# Patient Record
Sex: MALE | Race: WHITE | NOT HISPANIC OR LATINO | Employment: OTHER | URBAN - NONMETROPOLITAN AREA
[De-identification: names, ages, dates, MRNs, and addresses within clinical notes are randomized per-mention and may not be internally consistent; named-entity substitution may affect disease eponyms.]

---

## 2017-02-03 ENCOUNTER — HOSPITAL ENCOUNTER (OUTPATIENT)
Facility: MEDICAL CENTER | Age: 82
End: 2017-02-03
Attending: FAMILY MEDICINE
Payer: MEDICARE

## 2017-02-03 LAB
INR PPP: 3.85 (ref 0.87–1.13)
PROTHROMBIN TIME: 39 SEC (ref 12–14.6)

## 2017-02-03 PROCEDURE — 85610 PROTHROMBIN TIME: CPT

## 2017-02-21 ENCOUNTER — HOSPITAL ENCOUNTER (OUTPATIENT)
Facility: MEDICAL CENTER | Age: 82
End: 2017-02-21
Attending: FAMILY MEDICINE
Payer: MEDICARE

## 2017-02-21 LAB
HCT VFR BLD AUTO: 34.2 % (ref 42–52)
HGB BLD-MCNC: 11.2 G/DL (ref 14–18)
INR PPP: 2.8 (ref 0.87–1.13)
PROTHROMBIN TIME: 30.4 SEC (ref 12–14.6)

## 2017-02-21 PROCEDURE — 85610 PROTHROMBIN TIME: CPT

## 2017-02-21 PROCEDURE — 85018 HEMOGLOBIN: CPT

## 2017-02-21 PROCEDURE — 85014 HEMATOCRIT: CPT

## 2017-03-07 ENCOUNTER — HOSPITAL ENCOUNTER (OUTPATIENT)
Facility: MEDICAL CENTER | Age: 82
End: 2017-03-07
Attending: FAMILY MEDICINE
Payer: MEDICARE

## 2017-03-07 LAB
INR PPP: 2.05 (ref 0.87–1.13)
PROTHROMBIN TIME: 23.8 SEC (ref 12–14.6)

## 2017-03-07 PROCEDURE — 85610 PROTHROMBIN TIME: CPT

## 2017-03-20 ENCOUNTER — HOSPITAL ENCOUNTER (OUTPATIENT)
Facility: MEDICAL CENTER | Age: 82
DRG: 394 | End: 2017-03-20
Attending: FAMILY MEDICINE
Payer: MEDICARE

## 2017-03-20 LAB
HCT VFR BLD AUTO: 35.1 % (ref 42–52)
HGB BLD-MCNC: 11.6 G/DL (ref 14–18)
INR PPP: 2.85 (ref 0.87–1.13)
PROTHROMBIN TIME: 30.8 SEC (ref 12–14.6)

## 2017-03-22 ENCOUNTER — APPOINTMENT (OUTPATIENT)
Dept: RADIOLOGY | Facility: MEDICAL CENTER | Age: 82
DRG: 394 | End: 2017-03-22
Attending: EMERGENCY MEDICINE
Payer: MEDICARE

## 2017-03-22 ENCOUNTER — HOSPITAL ENCOUNTER (INPATIENT)
Facility: MEDICAL CENTER | Age: 82
LOS: 5 days | DRG: 394 | End: 2017-03-27
Attending: EMERGENCY MEDICINE | Admitting: INTERNAL MEDICINE
Payer: MEDICARE

## 2017-03-22 ENCOUNTER — RESOLUTE PROFESSIONAL BILLING HOSPITAL PROF FEE (OUTPATIENT)
Dept: MEDSURG UNIT | Facility: MEDICAL CENTER | Age: 82
End: 2017-03-22
Payer: MEDICARE

## 2017-03-22 DIAGNOSIS — K61.0 PERIANAL ABSCESS: ICD-10-CM

## 2017-03-22 DIAGNOSIS — H54.7 BLINDNESS: ICD-10-CM

## 2017-03-22 DIAGNOSIS — K60.3 PERIANAL FISTULA: ICD-10-CM

## 2017-03-22 LAB
ALBUMIN SERPL BCP-MCNC: 3.2 G/DL (ref 3.2–4.9)
ALBUMIN/GLOB SERPL: 1.1 G/DL
ALP SERPL-CCNC: 60 U/L (ref 30–99)
ALT SERPL-CCNC: 8 U/L (ref 2–50)
ANION GAP SERPL CALC-SCNC: 7 MMOL/L (ref 0–11.9)
APPEARANCE UR: CLEAR
AST SERPL-CCNC: 11 U/L (ref 12–45)
BASOPHILS # BLD AUTO: 0.4 % (ref 0–1.8)
BASOPHILS # BLD: 0.01 K/UL (ref 0–0.12)
BILIRUB SERPL-MCNC: 0.6 MG/DL (ref 0.1–1.5)
BILIRUB UR QL STRIP.AUTO: NEGATIVE
BUN SERPL-MCNC: 27 MG/DL (ref 8–22)
CALCIUM SERPL-MCNC: 9.9 MG/DL (ref 8.5–10.5)
CHLORIDE SERPL-SCNC: 109 MMOL/L (ref 96–112)
CO2 SERPL-SCNC: 25 MMOL/L (ref 20–33)
COLOR UR: NORMAL
CREAT SERPL-MCNC: 1.08 MG/DL (ref 0.5–1.4)
EOSINOPHIL # BLD AUTO: 0.02 K/UL (ref 0–0.51)
EOSINOPHIL NFR BLD: 0.8 % (ref 0–6.9)
ERYTHROCYTE [DISTWIDTH] IN BLOOD BY AUTOMATED COUNT: 48.8 FL (ref 35.9–50)
GFR SERPL CREATININE-BSD FRML MDRD: >60 ML/MIN/1.73 M 2
GLOBULIN SER CALC-MCNC: 3 G/DL (ref 1.9–3.5)
GLUCOSE SERPL-MCNC: 108 MG/DL (ref 65–99)
GLUCOSE UR STRIP.AUTO-MCNC: NEGATIVE MG/DL
HCT VFR BLD AUTO: 29.5 % (ref 42–52)
HGB BLD-MCNC: 9.7 G/DL (ref 14–18)
IMM GRANULOCYTES # BLD AUTO: 0.03 K/UL (ref 0–0.11)
IMM GRANULOCYTES NFR BLD AUTO: 1.2 % (ref 0–0.9)
KETONES UR STRIP.AUTO-MCNC: NEGATIVE MG/DL
LACTATE BLD-SCNC: 1.2 MMOL/L (ref 0.5–2)
LEUKOCYTE ESTERASE UR QL STRIP.AUTO: NEGATIVE
LYMPHOCYTES # BLD AUTO: 0.63 K/UL (ref 1–4.8)
LYMPHOCYTES NFR BLD: 25.1 % (ref 22–41)
MCH RBC QN AUTO: 28.9 PG (ref 27–33)
MCHC RBC AUTO-ENTMCNC: 32.9 G/DL (ref 33.7–35.3)
MCV RBC AUTO: 87.8 FL (ref 81.4–97.8)
MICRO URNS: NORMAL
MONOCYTES # BLD AUTO: 0.25 K/UL (ref 0–0.85)
MONOCYTES NFR BLD AUTO: 10 % (ref 0–13.4)
NEUTROPHILS # BLD AUTO: 1.57 K/UL (ref 1.82–7.42)
NEUTROPHILS NFR BLD: 62.5 % (ref 44–72)
NITRITE UR QL STRIP.AUTO: NEGATIVE
NRBC # BLD AUTO: 0 K/UL
NRBC BLD AUTO-RTO: 0 /100 WBC
PH UR STRIP.AUTO: 6 [PH]
PLATELET # BLD AUTO: 120 K/UL (ref 164–446)
PMV BLD AUTO: 10 FL (ref 9–12.9)
POTASSIUM SERPL-SCNC: 3.5 MMOL/L (ref 3.6–5.5)
PROT SERPL-MCNC: 6.2 G/DL (ref 6–8.2)
PROT UR QL STRIP: NEGATIVE MG/DL
RBC # BLD AUTO: 3.36 M/UL (ref 4.7–6.1)
RBC UR QL AUTO: NEGATIVE
SODIUM SERPL-SCNC: 141 MMOL/L (ref 135–145)
SP GR UR STRIP.AUTO: 1.01
WBC # BLD AUTO: 2.5 K/UL (ref 4.8–10.8)

## 2017-03-22 PROCEDURE — 700105 HCHG RX REV CODE 258: Performed by: INTERNAL MEDICINE

## 2017-03-22 PROCEDURE — 87205 SMEAR GRAM STAIN: CPT

## 2017-03-22 PROCEDURE — 93005 ELECTROCARDIOGRAM TRACING: CPT | Performed by: INTERNAL MEDICINE

## 2017-03-22 PROCEDURE — 304562 HCHG STAT O2 MASK/CANNULA

## 2017-03-22 PROCEDURE — 700105 HCHG RX REV CODE 258: Performed by: EMERGENCY MEDICINE

## 2017-03-22 PROCEDURE — 87086 URINE CULTURE/COLONY COUNT: CPT

## 2017-03-22 PROCEDURE — 87070 CULTURE OTHR SPECIMN AEROBIC: CPT

## 2017-03-22 PROCEDURE — 94760 N-INVAS EAR/PLS OXIMETRY 1: CPT

## 2017-03-22 PROCEDURE — 96366 THER/PROPH/DIAG IV INF ADDON: CPT

## 2017-03-22 PROCEDURE — 304561 HCHG STAT O2

## 2017-03-22 PROCEDURE — 71010 DX-CHEST-PORTABLE (1 VIEW): CPT

## 2017-03-22 PROCEDURE — 83605 ASSAY OF LACTIC ACID: CPT

## 2017-03-22 PROCEDURE — 74177 CT ABD & PELVIS W/CONTRAST: CPT

## 2017-03-22 PROCEDURE — 87186 SC STD MICRODIL/AGAR DIL: CPT | Mod: 91

## 2017-03-22 PROCEDURE — 700111 HCHG RX REV CODE 636 W/ 250 OVERRIDE (IP): Performed by: INTERNAL MEDICINE

## 2017-03-22 PROCEDURE — 96365 THER/PROPH/DIAG IV INF INIT: CPT

## 2017-03-22 PROCEDURE — 81003 URINALYSIS AUTO W/O SCOPE: CPT

## 2017-03-22 PROCEDURE — 87077 CULTURE AEROBIC IDENTIFY: CPT | Mod: 91

## 2017-03-22 PROCEDURE — 93010 ELECTROCARDIOGRAM REPORT: CPT | Performed by: INTERNAL MEDICINE

## 2017-03-22 PROCEDURE — 87040 BLOOD CULTURE FOR BACTERIA: CPT

## 2017-03-22 PROCEDURE — 80053 COMPREHEN METABOLIC PANEL: CPT

## 2017-03-22 PROCEDURE — 700111 HCHG RX REV CODE 636 W/ 250 OVERRIDE (IP): Performed by: EMERGENCY MEDICINE

## 2017-03-22 PROCEDURE — 96368 THER/DIAG CONCURRENT INF: CPT

## 2017-03-22 PROCEDURE — 99285 EMERGENCY DEPT VISIT HI MDM: CPT

## 2017-03-22 PROCEDURE — 700117 HCHG RX CONTRAST REV CODE 255: Performed by: EMERGENCY MEDICINE

## 2017-03-22 PROCEDURE — 770006 HCHG ROOM/CARE - MED/SURG/GYN SEMI*

## 2017-03-22 PROCEDURE — 85025 COMPLETE CBC W/AUTO DIFF WBC: CPT

## 2017-03-22 RX ORDER — POTASSIUM CHLORIDE 20 MEQ/1
10 TABLET, EXTENDED RELEASE ORAL
COMMUNITY

## 2017-03-22 RX ORDER — DOXYCYCLINE 100 MG/1
100 TABLET ORAL 2 TIMES DAILY
Status: ON HOLD | COMMUNITY
Start: 2017-03-14 | End: 2017-03-27

## 2017-03-22 RX ORDER — ALFUZOSIN HYDROCHLORIDE 10 MG/1
10 TABLET, EXTENDED RELEASE ORAL DAILY
COMMUNITY

## 2017-03-22 RX ORDER — AMOXICILLIN 250 MG
2 CAPSULE ORAL 2 TIMES DAILY
Status: DISCONTINUED | OUTPATIENT
Start: 2017-03-22 | End: 2017-03-27 | Stop reason: HOSPADM

## 2017-03-22 RX ORDER — POLYETHYLENE GLYCOL 3350 17 G/17G
1 POWDER, FOR SOLUTION ORAL
Status: DISCONTINUED | OUTPATIENT
Start: 2017-03-22 | End: 2017-03-27 | Stop reason: HOSPADM

## 2017-03-22 RX ORDER — SODIUM CHLORIDE 9 MG/ML
INJECTION, SOLUTION INTRAVENOUS CONTINUOUS
Status: DISCONTINUED | OUTPATIENT
Start: 2017-03-22 | End: 2017-03-24

## 2017-03-22 RX ORDER — AMPICILLIN AND SULBACTAM 2; 1 G/1; G/1
3 INJECTION, POWDER, FOR SOLUTION INTRAMUSCULAR; INTRAVENOUS ONCE
Status: COMPLETED | OUTPATIENT
Start: 2017-03-22 | End: 2017-03-22

## 2017-03-22 RX ORDER — FUROSEMIDE 20 MG/1
10 TABLET ORAL
COMMUNITY

## 2017-03-22 RX ORDER — DOCUSATE CALCIUM 240 MG
240 CAPSULE ORAL DAILY
COMMUNITY

## 2017-03-22 RX ORDER — ACETAMINOPHEN 500 MG
500-1000 TABLET ORAL SEE ADMIN INSTRUCTIONS
COMMUNITY

## 2017-03-22 RX ORDER — FINASTERIDE 5 MG/1
5 TABLET, FILM COATED ORAL DAILY
COMMUNITY

## 2017-03-22 RX ORDER — GABAPENTIN 100 MG/1
100 CAPSULE ORAL 2 TIMES DAILY PRN
COMMUNITY

## 2017-03-22 RX ORDER — SERTRALINE HYDROCHLORIDE 25 MG/1
37.5 TABLET, FILM COATED ORAL DAILY
COMMUNITY

## 2017-03-22 RX ORDER — BISACODYL 10 MG
10 SUPPOSITORY, RECTAL RECTAL
Status: DISCONTINUED | OUTPATIENT
Start: 2017-03-22 | End: 2017-03-27 | Stop reason: HOSPADM

## 2017-03-22 RX ORDER — AMLODIPINE BESYLATE 2.5 MG/1
2.5 TABLET ORAL DAILY
COMMUNITY

## 2017-03-22 RX ORDER — GABAPENTIN 100 MG/1
100 CAPSULE ORAL
COMMUNITY

## 2017-03-22 RX ORDER — CALCITRIOL 0.25 UG/1
0.25 CAPSULE, LIQUID FILLED ORAL DAILY
COMMUNITY

## 2017-03-22 RX ORDER — ACETAMINOPHEN 325 MG/1
650 TABLET ORAL EVERY 6 HOURS PRN
Status: DISCONTINUED | OUTPATIENT
Start: 2017-03-22 | End: 2017-03-27 | Stop reason: HOSPADM

## 2017-03-22 RX ORDER — WARFARIN SODIUM 2 MG/1
3 TABLET ORAL EVERY EVENING
COMMUNITY

## 2017-03-22 RX ORDER — SODIUM CHLORIDE 9 MG/ML
1000 INJECTION, SOLUTION INTRAVENOUS CONTINUOUS
Status: DISCONTINUED | OUTPATIENT
Start: 2017-03-22 | End: 2017-03-22

## 2017-03-22 RX ADMIN — PIPERACILLIN AND TAZOBACTAM 3.38 G: 3; .375 INJECTION, POWDER, LYOPHILIZED, FOR SOLUTION INTRAVENOUS; PARENTERAL at 22:40

## 2017-03-22 RX ADMIN — SODIUM CHLORIDE 1000 ML: 9 INJECTION, SOLUTION INTRAVENOUS at 15:15

## 2017-03-22 RX ADMIN — IOHEXOL 50 ML: 240 INJECTION, SOLUTION INTRATHECAL; INTRAVASCULAR; INTRAVENOUS; ORAL at 17:22

## 2017-03-22 RX ADMIN — VANCOMYCIN HYDROCHLORIDE 2300 MG: 100 INJECTION, POWDER, LYOPHILIZED, FOR SOLUTION INTRAVENOUS at 16:24

## 2017-03-22 RX ADMIN — IOHEXOL 100 ML: 350 INJECTION, SOLUTION INTRAVENOUS at 17:22

## 2017-03-22 RX ADMIN — AMPICILLIN SODIUM AND SULBACTAM SODIUM 3 G: 2; 1 INJECTION, POWDER, FOR SOLUTION INTRAMUSCULAR; INTRAVENOUS at 16:02

## 2017-03-22 RX ADMIN — SODIUM CHLORIDE: 9 INJECTION, SOLUTION INTRAVENOUS at 22:10

## 2017-03-22 ASSESSMENT — PAIN SCALES - GENERAL
PAINLEVEL_OUTOF10: 0

## 2017-03-22 ASSESSMENT — ENCOUNTER SYMPTOMS
SHORTNESS OF BREATH: 0
VOMITING: 0
ABDOMINAL PAIN: 1
FEVER: 0
NAUSEA: 0
ROS SKIN COMMENTS: ABSCESS

## 2017-03-22 NOTE — IP AVS SNAPSHOT
" <p align=\"LEFT\"><IMG SRC=\"//[email or web address]$/[email or web address]\" alt=\"Image\" WIDTH=\"50%\" HEIGHT=\"200\" BORDER=\"\"></p>                   Name:Jimmy Perez  Medical Record Number:[id number]  CSN: [id number]    YOB: 1925   Age: 89 y.o.  Sex: male  HT:1.829 m (6') WT: 90.1 kg (198 lb 10.2 oz)          Admit Date: 3/22/2017     Discharge Date:   Today's Date: 3/27/2017  Attending Doctor:  Alice Yo M.D.                  Allergies:  Review of patient's allergies indicates no known allergies.             Medication List      Take these Medications        Instructions    acetaminophen 500 MG Tabs   Commonly known as:  TYLENOL    Take 500-1,000 mg by mouth See Admin Instructions. 1000 mg @ 0800 500 mg @ 1200 1000 mg @@ 2000   Dose:  500-1000 mg       alfuzosin 10 MG SR tablet   Commonly known as:  UROXATRAL    Take 10 mg by mouth every day.   Dose:  10 mg       amlodipine 2.5 MG Tabs   Commonly known as:  NORVASC    Take 2.5 mg by mouth every day.   Dose:  2.5 mg       amoxicillin-clavulanate 875-125 MG Tabs   Commonly known as:  AUGMENTIN    Take 1 Tab by mouth 2 times a day for 5 days.   Dose:  1 Tab       calcitRIOL 0.25 MCG Caps   Commonly known as:  ROCALTROL    Take 0.25 mcg by mouth every day.   Dose:  0.25 mcg       CALCIUM 500+D 500-200 MG-UNIT Tabs   Generic drug:  Calcium Carb-Cholecalciferol    Take 1 Tab by mouth every morning with breakfast.   Dose:  1 Tab       docusate calcium 240 MG Caps   Commonly known as:  SURFAK    Take 240 mg by mouth every day.   Dose:  240 mg       finasteride 5 MG Tabs   Commonly known as:  PROSCAR    Take 5 mg by mouth every day.   Dose:  5 mg       furosemide 20 MG Tabs   Commonly known as:  LASIX    Take 10 mg by mouth every 48 hours.   Dose:  10 mg       * gabapentin 100 MG Caps   Commonly known as:  NEURONTIN    Take 100 mg by mouth every bedtime.   Dose:  100 mg       * gabapentin 100 MG Caps   Commonly known as:  NEURONTIN    Take 100 mg by mouth 2 times a " day as needed. Indications: Diabetes with Nerve Disease   Dose:  100 mg       potassium chloride SA 20 MEQ Tbcr   Commonly known as:  Kdur    Take 10 mEq by mouth every 48 hours.   Dose:  10 mEq       sertraline 25 MG tablet   Commonly known as:  ZOLOFT    Take 37.5 mg by mouth every day.   Dose:  37.5 mg       warfarin 2 MG Tabs   Commonly known as:  COUMADIN    Take 3 mg by mouth every evening.   Dose:  3 mg       * Notice:  This list has 2 medication(s) that are the same as other medications prescribed for you. Read the directions carefully, and ask your doctor or other care provider to review them with you.

## 2017-03-22 NOTE — ED NOTES
Lab called with critical result of WBC at 2.5. Critical lab result read back..   Dr. Levin  notified of critical lab result at 1542 .

## 2017-03-22 NOTE — IP AVS SNAPSHOT
Home Care Instructions                                                                                                                  Name:Jimmy Perez  Medical Record Number:[id number]  CSN: [id number]    YOB: 1925   Age: 89 y.o.  Sex: male  HT:1.829 m (6') WT: 90.1 kg (198 lb 10.2 oz)          Admit Date: 3/22/2017     Discharge Date:   Today's Date: 3/27/2017  Attending Doctor:  Alice Yo M.D.                  Allergies:  Review of patient's allergies indicates no known allergies.            Discharge Instructions       Discharge Instructions    Discharged to home by [facility] with self. Discharged via wheelchair, hospital escort: Yes.  Special equipment needed: Not Applicable    Be sure to schedule a follow-up appointment with your primary care doctor or any specialists as instructed.     Discharge Plan:   Influenza Vaccine Indication: Not indicated: Previously immunized this influenza season and > 8 years of age    I understand that a diet low in cholesterol, fat, and sodium is recommended for good health. Unless I have been given specific instructions below for another diet, I accept this instruction as my diet prescription.   Other diet: Nectar  thick    Special Instructions: None    · Is patient discharged on Warfarin / Coumadin?   No     · Is patient Post Blood Transfusion?  No    Depression / Suicide Risk    As you are discharged from this [facility] facility, it is important to learn how to keep safe from harming yourself.    Recognize the warning signs:  · Abrupt changes in personality, positive or negative- including increase in energy   · Giving away possessions  · Change in eating patterns- significant weight changes-  positive or negative  · Change in sleeping patterns- unable to sleep or sleeping all the time   · Unwillingness or inability to communicate  · Depression  · Unusual sadness, discouragement and loneliness  · Talk of wanting to die  · Neglect of personal  appearance   · Rebelliousness- reckless behavior  · Withdrawal from people/activities they love  · Confusion- inability to concentrate     If you or a loved one observes any of these behaviors or has concerns about self-harm, here's what you can do:  · Talk about it- your feelings and reasons for harming yourself  · Remove any means that you might use to hurt yourself (examples: pills, rope, extension cords, firearm)  · Get professional help from the community (Mental Health, Substance Abuse, psychological counseling)  · Do not be alone:Call your Safe Contact- someone whom you trust who will be there for you.  · Call your local CRISIS HOTLINE 593-8320 or 296-569-1970  · Call your local Children's Mobile Crisis Response Team [place] (274) 471-6164 or www.[employer]  · Call the toll free National Suicide Prevention Hotlines   · National Suicide Prevention Lifeline 365-680-RFDS (6402)  · [employer] Line Network 800-SUICIDE (437-0096)           Discharge Medication Instructions:    Below are the medications your physician expects you to take upon discharge:    Review all your home medications and newly ordered medications with your doctor and/or pharmacist. Follow medication instructions as directed by your doctor and/or pharmacist.    Please keep your medication list with you and share with your physician.               Medication List      START taking these medications        Instructions    amoxicillin-clavulanate 875-125 MG Tabs   Commonly known as:  AUGMENTIN    Take 1 Tab by mouth 2 times a day for 5 days.   Dose:  1 Tab         CONTINUE taking these medications        Instructions    acetaminophen 500 MG Tabs   Last time this was given:  650 mg on 3/26/2017  9:09 PM   Commonly known as:  TYLENOL    Take 500-1,000 mg by mouth See Admin Instructions. 1000 mg @ 0800 500 mg @ 1200 1000 mg @@ 2000   Dose:  500-1000 mg       alfuzosin 10 MG SR tablet   Commonly known as:  UROXATRAL    Take 10 mg by  mouth every day.   Dose:  10 mg       amlodipine 2.5 MG Tabs   Last time this was given:  2.5 mg on 3/27/2017  9:07 AM   Commonly known as:  NORVASC    Take 2.5 mg by mouth every day.   Dose:  2.5 mg       calcitRIOL 0.25 MCG Caps   Last time this was given:  0.25 mcg on 3/27/2017  9:07 AM   Commonly known as:  ROCALTROL    Take 0.25 mcg by mouth every day.   Dose:  0.25 mcg       CALCIUM 500+D 500-200 MG-UNIT Tabs   Generic drug:  Calcium Carb-Cholecalciferol    Take 1 Tab by mouth every morning with breakfast.   Dose:  1 Tab       docusate calcium 240 MG Caps   Commonly known as:  SURFAK    Take 240 mg by mouth every day.   Dose:  240 mg       finasteride 5 MG Tabs   Last time this was given:  5 mg on 3/27/2017  9:07 AM   Commonly known as:  PROSCAR    Take 5 mg by mouth every day.   Dose:  5 mg       furosemide 20 MG Tabs   Commonly known as:  LASIX    Take 10 mg by mouth every 48 hours.   Dose:  10 mg       * gabapentin 100 MG Caps   Commonly known as:  NEURONTIN    Take 100 mg by mouth every bedtime.   Dose:  100 mg       * gabapentin 100 MG Caps   Commonly known as:  NEURONTIN    Take 100 mg by mouth 2 times a day as needed. Indications: Diabetes with Nerve Disease   Dose:  100 mg       potassium chloride SA 20 MEQ Tbcr   Last time this was given:  20 mEq on 3/23/2017 12:25 PM   Commonly known as:  Kdur    Take 10 mEq by mouth every 48 hours.   Dose:  10 mEq       sertraline 25 MG tablet   Last time this was given:  37.5 mg on 3/27/2017  9:06 AM   Commonly known as:  ZOLOFT    Take 37.5 mg by mouth every day.   Dose:  37.5 mg       warfarin 2 MG Tabs   Commonly known as:  COUMADIN    Take 3 mg by mouth every evening.   Dose:  3 mg       * Notice:  This list has 2 medication(s) that are the same as other medications prescribed for you. Read the directions carefully, and ask your doctor or other care provider to review them with you.      STOP taking these medications     doxycycline monohydrate 100 MG tablet      Commonly known as:  ADOXA               Instructions           Diet / Nutrition:    Follow any diet instructions given to you by your doctor or the dietician, including how much salt (sodium) you are allowed each day.    If you are overweight, talk to your doctor about a weight reduction plan.    Activity:    Remain physically active following your doctor's instructions about exercise and activity.    Rest often.     Any time you become even a little tired or short of breath, SIT DOWN and rest.    Worsening Symptoms:    Report any of the following signs and symptoms to the doctor's office immediately:    *Pain of jaw, arm, or neck  *Chest pain not relieved by medication                               *Dizziness or loss of consciousness  *Difficulty breathing even when at rest   *More tired than usual                                       *Bleeding drainage or swelling of surgical site  *Swelling of feet, ankles, legs or stomach                 *Fever (>100ºF)  *Pink or blood tinged sputum  *Weight gain (3lbs/day or 5lbs /week)           *Shock from internal defibrillator (if applicable)  *Palpitations or irregular heartbeats                *Cool and/or numb extremities    Stroke Awareness    Common Risk Factors for Stroke include:    Age  Atrial Fibrillation  Carotid Artery Stenosis  Diabetes Mellitus  Excessive alcohol consumption  High blood pressure  Overweight   Physical inactivity  Smoking    Warning signs and symptoms of a stroke include:    *Sudden numbness or weakness of the face, arm or leg (especially on one side of the body).  *Sudden confusion, trouble speaking or understanding.  *Sudden trouble seeing in one or both eyes.  *Sudden trouble walking, dizziness, loss of balance or coordination.Sudden severe headache with no known cause.    It is very important to get treatment quickly when a stroke occurs. If you experience any of the above warning signs, call 911 immediately.                   Disclaimer          Quit Smoking / Tobacco Use:    I understand the use of any tobacco products increases my chance of suffering from future heart disease or stroke and could cause other illnesses which may shorten my life. Quitting the use of tobacco products is the single most important thing I can do to improve my health. For further information on smoking / tobacco cessation call a Toll Free Quit Line at 1-951.582.1789 (*National Cancer [place]) or 1-420.727.5004 (American Lung Association) or you can access the web based program at www.lungusa.org.    [place] Tobacco Users Help Line:  (464) 718-9854       Toll Free: 1-815.397.1858  Quit Tobacco Program [facility] (425)517-1428    Crisis Hotline:    [place] Crisis Hotline:  8-270-ZMBYMLB or 1-853.795.6171    [place] Crisis Hotline:    1-558.890.3121 or 844-834-4186    Discharge Survey:   Thank you for choosing [facility]. We hope we did everything we could to make your hospital stay a pleasant one. You may be receiving a phone survey and we would appreciate your time and participation in answering the questions. Your input is very valuable to us in our efforts to improve our service to our patients and their families.        My signature on this form indicates that:    1. I have reviewed and understand the above information.  2. My questions regarding this information have been answered to my satisfaction.  3. I have formulated a plan with my discharge nurse to obtain my prescribed medications for home.                  Disclaimer         __________________________________                     __________       ________                       Patient Signature                                                 Date                    Time

## 2017-03-22 NOTE — IP AVS SNAPSHOT
3/27/2017          Jimmy Perez  [address] [place] [address]    Dear Jimmy:    [facility] wants to ensure your discharge home is safe and you or your loved ones have had all your questions answered regarding your care after you leave the hospital.    You may receive a telephone call within two days of your discharge.  This call is to make certain you understand your discharge instructions as well as ensure we provided you with the best care possible during your stay with us.     The call will only last approximately 3-5 minutes and will be done by a nurse.    Once again, we want to ensure your discharge home is safe and that you have a clear understanding of any next steps in your care.  If you have any questions or concerns, please do not hesitate to contact us, we are here for you.  Thank you for choosing [facility] for your healthcare needs.    Sincerely,    De Carr    [facility]

## 2017-03-22 NOTE — ED PROVIDER NOTES
ED Provider Note    Scribed for Rojas Cruz M.D. by Devora Stephens. 3/22/2017, 2:54 PM.    [facility] provider[facility]  Means of arrival: Ambulance  History obtained from: Patient   History limited by: None     CHIEF COMPLAINT  Chief Complaint   Patient presents with   • Abscess     abscess on leg that has now enlarged and is on rectum. denies CP, SOB, Bleeding, any pain     HPI  Jimmy Perez is a 89 y.o. male who presents to the Emergency Department for an abscess to his buttocks that was opened in the past that has now enlarged. He states that they were packed three weeks ago but are now open and extremely painful, exacerbated with sitting.  The patient denies any recent fevers, shortness of breath, chest pain. Per patient he lives at home with his wife and has home nursing help. Patient is a very poor historian. Patient is describing that he has a hole in his backside. He is alert to person, place, time, it sounds like to events, but again a very poor historian. Sounds like the patient as above was treated for a perianal abscess and was seen by home health today and thought that the abscess was worsening, so the patient was sent to the ED for evaluation.    Further HPI limited secondary to patient's baseline mental status     REVIEW OF SYSTEMS  Review of Systems   Unable to perform ROS: mental acuity   Constitutional: Negative for fever.   Respiratory: Negative for shortness of breath.    Cardiovascular: Negative for chest pain.   Gastrointestinal: Positive for abdominal pain. Negative for nausea and vomiting.   Skin:        Abscess      PAST MEDICAL HISTORY   Past medical history of abscesses     SURGICAL HISTORY  patient denies any surgical history    SOCIAL HISTORY  Patient from [place]     FAMILY HISTORY  No family history noted    CURRENT MEDICATIONS  No current facility-administered medications on file prior to encounter.     No current outpatient prescriptions on  file prior to encounter.     ALLERGIES  No Known Allergies    PHYSICAL EXAM  VITAL SIGNS: /87 mmHg  Pulse 70  Resp 24  Ht 1.829 m (6')  Wt 90.719 kg (200 lb)  BMI 27.12 kg/m2  SpO2 95%    Constitutional: elderly in appearance, No acute distress, confused, Non-toxic appearance.   HENT: Normocephalic, Atraumatic, Bilateral external ears normal, oropharynx dry, No oral exudates, Nose normal.   Eyes: Pupils are equal round and react to light, extraocular motions are intact, conjunctiva is normal, there are no signs of exudate.   Neck: Supple, no meningeal signs.  Lymphatic: No lymphadenopathy noted.   Cardiovascular: Regular rate and rhythm without murmurs gallops or rubs.   Thorax & Lungs: No respiratory distress. Breathing comfortably. Lungs are clear to auscultation bilaterally, there are no wheezes no rales. Chest wall is nontender.  Abdomen: Soft, nontender, nondistended. Bowel sounds are present.   : Perirectal; Old scar and 2 inch gauze appears to be abscess cavity no drainage, no signs of new abscess, Right thigh, draining abscess with packing present   Skin: Warm, Dry, No erythema,   Back: No tenderness, No CVA tenderness.  Musculoskeletal: Good range of motion in all major joints. No tenderness to palpation or major deformities noted. Intact distal pulses, no clubbing, no cyanosis, no edema, AKA, left lower extremity.   Neurologic: Alert & oriented to person, place and time but poor historian, confused, Moving all extremities. No gross abnormalities.    Psychiatric: Affect normal, Judgment normal, Mood normal.     LABS  Results for orders placed or performed during the hospital encounter of 03/22/17   LACTIC ACID   Result Value Ref Range    Lactic Acid 1.2 0.5 - 2.0 mmol/L   CBC WITH DIFFERENTIAL   Result Value Ref Range    WBC 2.5 (LL) 4.8 - 10.8 K/uL    RBC 3.36 (L) 4.70 - 6.10 M/uL    Hemoglobin 9.7 (L) 14.0 - 18.0 g/dL    Hematocrit 29.5 (L) 42.0 - 52.0 %    MCV 87.8 81.4 - 97.8 fL    MCH  28.9 27.0 - 33.0 pg    MCHC 32.9 (L) 33.7 - 35.3 g/dL    RDW 48.8 35.9 - 50.0 fL    Platelet Count 120 (L) 164 - 446 K/uL    MPV 10.0 9.0 - 12.9 fL    Neutrophils-Polys 62.50 44.00 - 72.00 %    Lymphocytes 25.10 22.00 - 41.00 %    Monocytes 10.00 0.00 - 13.40 %    Eosinophils 0.80 0.00 - 6.90 %    Basophils 0.40 0.00 - 1.80 %    Immature Granulocytes 1.20 (H) 0.00 - 0.90 %    Nucleated RBC 0.00 /100 WBC    Neutrophils (Absolute) 1.57 (L) 1.82 - 7.42 K/uL    Lymphs (Absolute) 0.63 (L) 1.00 - 4.80 K/uL    Monos (Absolute) 0.25 0.00 - 0.85 K/uL    Eos (Absolute) 0.02 0.00 - 0.51 K/uL    Baso (Absolute) 0.01 0.00 - 0.12 K/uL    Immature Granulocytes (abs) 0.03 0.00 - 0.11 K/uL    NRBC (Absolute) 0.00 K/uL   COMP METABOLIC PANEL   Result Value Ref Range    Sodium 141 135 - 145 mmol/L    Potassium 3.5 (L) 3.6 - 5.5 mmol/L    Chloride 109 96 - 112 mmol/L    Co2 25 20 - 33 mmol/L    Anion Gap 7.0 0.0 - 11.9    Glucose 108 (H) 65 - 99 mg/dL    Bun 27 (H) 8 - 22 mg/dL    Creatinine 1.08 0.50 - 1.40 mg/dL    Calcium 9.9 8.5 - 10.5 mg/dL    AST(SGOT) 11 (L) 12 - 45 U/L    ALT(SGPT) 8 2 - 50 U/L    Alkaline Phosphatase 60 30 - 99 U/L    Total Bilirubin 0.6 0.1 - 1.5 mg/dL    Albumin 3.2 3.2 - 4.9 g/dL    Total Protein 6.2 6.0 - 8.2 g/dL    Globulin 3.0 1.9 - 3.5 g/dL    A-G Ratio 1.1 g/dL   URINALYSIS   Result Value Ref Range    Color Lt. Yellow     Character Clear     Specific Gravity 1.010 <1.035    Ph 6.0 5.0-8.0    Glucose Negative Negative mg/dL    Ketones Negative Negative mg/dL    Protein Negative Negative mg/dL    Bilirubin Negative Negative    Nitrite Negative Negative    Leukocyte Esterase Negative Negative    Occult Blood Negative Negative    Micro Urine Req see below    ESTIMATED GFR   Result Value Ref Range    GFR If African American >60 >60 mL/min/1.73 m 2    GFR If Non African American >60 >60 mL/min/1.73 m 2   EKG   Result Value Ref Range    Report       [facility] Emergency Dept.    Test  Date:  2017  Pt Name:    LUDY HERBERT           Department: ER  MRN:        [id number]                      Room:        09  Gender:     M                            Technician: [id number]  :        [id number]                   Requested By:ABDIEL STARR  Order #:    [id number]                    Reading MD:    Measurements  Intervals                                Axis  Rate:       70                           P:          0  NH:         216                          QRS:        -77  QRSD:       162                          T:          103  QT:         460  QTc:        497    Interpretive Statements  VENTRICULAR-PACED COMPLEXES  NO FURTHER RHYTHM ANALYSIS ATTEMPTED DUE TO PACED RHYTHM  BORDERLINE AV CONDUCTION DELAY  CONSIDER LEFT ATRIAL ABNORMALITY  LEFT BUNDLE BRANCH BLOCK  BASELINE WANDER IN LEAD(S) III,aVL,V4  No previous ECG available for comparison     All labs reviewed by me.    RADIOLOGY  CT-ABDOMEN-PELVIS WITH   Final Result      1.  Perianal fistula. Details of sphincter involvement cannot be determined on CT. No abscess is appreciated.      2.  Hiatal hernia.      3.  Diverticulosis.      4.  2.5 cm indeterminate hypodense left renal mass, possibly a hemorrhagic cyst. If clinically indicated, nonemergent renal protocol CT or MRI could be performed for further evaluation.      5.  Atherosclerosis.      6.  Small bilateral pleural effusions with adjacent atelectasis. Peribronchial thickening in the left lung base is consistent with inflammation/bronchitis.      7.  Cardiomegaly      DX-CHEST-PORTABLE (1 VIEW)   Final Result      1.  Mild basilar atelectasis or scar      2.  Enlarged cardiac silhouette      The radiologist's interpretation of all radiological studies have been reviewed by me.    EKG  Interpreted by me    Rhythm: ventricular paced complex   Rate: 70 which is normal  No further clinical details can be obtained.     COURSE & MEDICAL DECISION MAKING  Pertinent Labs & Imaging studies  reviewed. (See chart for details)    2:54 PM - Patient seen and examined at bedside. Patient will be treated with 1L NS infusion IV. Ordered abdomen-pelvis CT, chest x-ray, Lactic acid, Wound culture, CBC with diff, CMP, Urinalysis, Urine culture, blood cultures to evaluate his symptoms. Explained that the patient would need to be admitted for wound care and would be updated on testing results when they return. He understands and agrees.     3:49 PM- I was called by lab and informed of a critical WBC of 2.5. Consulting with pharmacy for course of treatment.     3:57 PM- I am treating the patient with 3 g Unasyn IV, 2300 mg Vancomycin IV.     5:55 PM- Reviewed CT scan.     5:55 PM Paged general surgeon and [facility] internal medicine.     6:06 PM- I discussed the patient's case and the above findings with [facility] internal medicine who agrees to admit the patient.     6:06 PM- Patent updated on plan for admittance. He understands and agrees.     6:06 PM- Paged general surgeon.     6:09 PM- I discussed the patient's case and the above findings with Dr. christensen (general surgeon) who agrees to consult on the patient.     Decision Making:  Upon arrival, the patient is rather confused and he stills alert person, place, sounds like to time and events, but again is a very poor historian. I initially started the patient with vancomycin and Unasyn. He does have the draining wound. I see no new signs of abscess. There is no signs of induration, but I did do a CT scan with rectal contrast through the area. There is no new findings of abscess. At this point, I do feel the patient is more of a wound care candidate edema. He does have the fistula seen on CT scan. I spoke with Dr. Christensen who will consult on the patient and I spoken to [facility] medicine. I'll admit the patient for further treatment and care.    DISPOSITION:  Patient will be admitted to [facility] internal medicine in guarded condition.    FINAL IMPRESSION  1. Perianal fistula     2. Perianal abscess    3. Blindness          I, Devora Stephens (Scribe), am scribing for, and in the presence of, Rojas Cruz M.D..    Electronically signed by: Devora Stephens (Scribe), 3/22/2017    IRojas M.D. personally performed the services described in this documentation, as scribed by Devora Stephens in my presence, and it is both accurate and complete.    The note accurately reflects work and decisions made by me.  Rojas Cruz  3/22/2017  10:27 PM    \

## 2017-03-22 NOTE — IP AVS SNAPSHOT
AlphaClone Access Code: [email or web address]  Expires: 4/26/2017  1:37 PM    Your email address is not on file at [facility].  Email Addresses are required for you to sign up for AlphaClone, please contact [phone] to verify your personal information and to provide your email address prior to attempting to register for AlphaClone.    Jimmy Perez  [address] [place] [address]    AlphaClone  A secure, online tool to manage your health information     [facility]’s AlphaClone® is a secure, online tool that connects you to your personalized health information from the privacy of your home -- day or night - making it very easy for you to manage your healthcare. Once the activation process is completed, you can even access your medical information using the AlphaClone london, which is available for free in the Apple [place] store or Google Play store.     To learn more about AlphaClone, visit [email or web address]    There are two levels of access available (as shown below):   My Chart Features  [facility] Primary Care Doctor [facility]  Specialists [facility]  Urgent  Care Non-[facility] Primary Care Doctor   Email your healthcare team securely and privately 24/7 X X X    Manage appointments: schedule your next appointment; view details of past/upcoming appointments X      Request prescription refills. X      View recent personal medical records, including lab and immunizations X X X X   View health record, including health history, allergies, medications X X X X   Read reports about your outpatient visits, procedures, consult and ER notes X X X X   See your discharge summary, which is a recap of your hospital and/or ER visit that includes your diagnosis, lab results, and care plan X X  X     How to register for [employer]MeNow[employer]:  Once your e-mail address has been verified, follow the following steps to sign up for DubMeNowt.     1. Go to  [email or web address]  2. Click on the Sign Up Now box, which takes you to the New Member Sign Up page. You  will need to provide the following information:  a. Enter your iPawn Access Code exactly as it appears at the top of this page. (You will not need to use this code after you’ve completed the sign-up process. If you do not sign up before the expiration date, you must request a new code.)   b. Enter your date of birth.   c. Enter your home email address.   d. Click Submit, and follow the next screen’s instructions.  3. Create a [employer] Networkst ID. This will be your iPawn login ID and cannot be changed, so think of one that is secure and easy to remember.  4. Create a iPawn password. You can change your password at any time.  5. Enter your Password Reset Question and Answer. This can be used at a later time if you forget your password.   6. Enter your e-mail address. This allows you to receive e-mail notifications when new information is available in iPawn.  7. Click Sign Up. You can now view your health information.    For assistance activating your iPawn account, call [phone]

## 2017-03-22 NOTE — ED NOTES
Chief Complaint   Patient presents with   • Abscess     abscess on leg that has now enlarged and is on rectum. denies CP, SOB, Bleeding, any pain

## 2017-03-23 PROBLEM — K61.0 ANAL ABSCESS: Status: ACTIVE | Noted: 2017-03-22

## 2017-03-23 PROBLEM — R78.81 BACTEREMIA DUE TO GRAM-POSITIVE BACTERIA: Status: ACTIVE | Noted: 2017-03-23

## 2017-03-23 PROBLEM — R79.1 SUPRATHERAPEUTIC INR: Status: ACTIVE | Noted: 2017-03-23

## 2017-03-23 PROBLEM — F05 DELIRIUM SUPERIMPOSED ON DEMENTIA: Status: ACTIVE | Noted: 2017-03-23

## 2017-03-23 LAB
25(OH)D3 SERPL-MCNC: 21 NG/ML (ref 30–100)
ALBUMIN SERPL BCP-MCNC: 3.1 G/DL (ref 3.2–4.9)
ALBUMIN/GLOB SERPL: 1 G/DL
ALP SERPL-CCNC: 58 U/L (ref 30–99)
ALT SERPL-CCNC: 7 U/L (ref 2–50)
ANION GAP SERPL CALC-SCNC: 6 MMOL/L (ref 0–11.9)
AST SERPL-CCNC: 10 U/L (ref 12–45)
BASOPHILS # BLD AUTO: 0.2 % (ref 0–1.8)
BASOPHILS # BLD: 0.01 K/UL (ref 0–0.12)
BILIRUB SERPL-MCNC: 0.8 MG/DL (ref 0.1–1.5)
BUN SERPL-MCNC: 27 MG/DL (ref 8–22)
CALCIUM SERPL-MCNC: 9.6 MG/DL (ref 8.5–10.5)
CHLORIDE SERPL-SCNC: 110 MMOL/L (ref 96–112)
CO2 SERPL-SCNC: 26 MMOL/L (ref 20–33)
CREAT SERPL-MCNC: 1.22 MG/DL (ref 0.5–1.4)
EOSINOPHIL # BLD AUTO: 0.01 K/UL (ref 0–0.51)
EOSINOPHIL NFR BLD: 0.2 % (ref 0–6.9)
ERYTHROCYTE [DISTWIDTH] IN BLOOD BY AUTOMATED COUNT: 48.2 FL (ref 35.9–50)
FOLATE SERPL-MCNC: 13.7 NG/ML
GFR SERPL CREATININE-BSD FRML MDRD: 56 ML/MIN/1.73 M 2
GLOBULIN SER CALC-MCNC: 3.1 G/DL (ref 1.9–3.5)
GLUCOSE SERPL-MCNC: 142 MG/DL (ref 65–99)
GRAM STN SPEC: NORMAL
HCT VFR BLD AUTO: 33.4 % (ref 42–52)
HGB BLD-MCNC: 10.9 G/DL (ref 14–18)
IMM GRANULOCYTES # BLD AUTO: 0.09 K/UL (ref 0–0.11)
IMM GRANULOCYTES NFR BLD AUTO: 1.6 % (ref 0–0.9)
INR PPP: 4.38 (ref 0.87–1.13)
LYMPHOCYTES # BLD AUTO: 0.36 K/UL (ref 1–4.8)
LYMPHOCYTES NFR BLD: 6.5 % (ref 22–41)
MCH RBC QN AUTO: 28.1 PG (ref 27–33)
MCHC RBC AUTO-ENTMCNC: 32.6 G/DL (ref 33.7–35.3)
MCV RBC AUTO: 86.1 FL (ref 81.4–97.8)
MONOCYTES # BLD AUTO: 0.79 K/UL (ref 0–0.85)
MONOCYTES NFR BLD AUTO: 14.3 % (ref 0–13.4)
NEUTROPHILS # BLD AUTO: 4.28 K/UL (ref 1.82–7.42)
NEUTROPHILS NFR BLD: 77.2 % (ref 44–72)
NRBC # BLD AUTO: 0 K/UL
NRBC BLD AUTO-RTO: 0 /100 WBC
PLATELET # BLD AUTO: 136 K/UL (ref 164–446)
PMV BLD AUTO: 10.2 FL (ref 9–12.9)
POTASSIUM SERPL-SCNC: 3.2 MMOL/L (ref 3.6–5.5)
PREALB SERPL-MCNC: 11 MG/DL (ref 18–38)
PROT SERPL-MCNC: 6.2 G/DL (ref 6–8.2)
PROTHROMBIN TIME: 43.2 SEC (ref 12–14.6)
RBC # BLD AUTO: 3.88 M/UL (ref 4.7–6.1)
SIGNIFICANT IND 70042: NORMAL
SITE SITE: NORMAL
SODIUM SERPL-SCNC: 142 MMOL/L (ref 135–145)
SOURCE SOURCE: NORMAL
TSH SERPL DL<=0.005 MIU/L-ACNC: 1.47 UIU/ML (ref 0.3–3.7)
VANCOMYCIN TROUGH SERPL-MCNC: 16.2 UG/ML (ref 10–20)
VIT B12 SERPL-MCNC: 1312 PG/ML (ref 211–911)
WBC # BLD AUTO: 5.5 K/UL (ref 4.8–10.8)

## 2017-03-23 PROCEDURE — 700102 HCHG RX REV CODE 250 W/ 637 OVERRIDE(OP): Performed by: STUDENT IN AN ORGANIZED HEALTH CARE EDUCATION/TRAINING PROGRAM

## 2017-03-23 PROCEDURE — 85025 COMPLETE CBC W/AUTO DIFF WBC: CPT

## 2017-03-23 PROCEDURE — 36415 COLL VENOUS BLD VENIPUNCTURE: CPT

## 2017-03-23 PROCEDURE — 700102 HCHG RX REV CODE 250 W/ 637 OVERRIDE(OP): Performed by: INTERNAL MEDICINE

## 2017-03-23 PROCEDURE — 92610 EVALUATE SWALLOWING FUNCTION: CPT

## 2017-03-23 PROCEDURE — 700111 HCHG RX REV CODE 636 W/ 250 OVERRIDE (IP)

## 2017-03-23 PROCEDURE — 82607 VITAMIN B-12: CPT

## 2017-03-23 PROCEDURE — 85610 PROTHROMBIN TIME: CPT

## 2017-03-23 PROCEDURE — 770006 HCHG ROOM/CARE - MED/SURG/GYN SEMI*

## 2017-03-23 PROCEDURE — 700105 HCHG RX REV CODE 258: Performed by: INTERNAL MEDICINE

## 2017-03-23 PROCEDURE — 80053 COMPREHEN METABOLIC PANEL: CPT

## 2017-03-23 PROCEDURE — 84443 ASSAY THYROID STIM HORMONE: CPT

## 2017-03-23 PROCEDURE — 84134 ASSAY OF PREALBUMIN: CPT

## 2017-03-23 PROCEDURE — 99223 1ST HOSP IP/OBS HIGH 75: CPT | Mod: AI,GC | Performed by: INTERNAL MEDICINE

## 2017-03-23 PROCEDURE — 82306 VITAMIN D 25 HYDROXY: CPT

## 2017-03-23 PROCEDURE — 700111 HCHG RX REV CODE 636 W/ 250 OVERRIDE (IP): Performed by: INTERNAL MEDICINE

## 2017-03-23 PROCEDURE — 700105 HCHG RX REV CODE 258

## 2017-03-23 PROCEDURE — G8997 SWALLOW GOAL STATUS: HCPCS | Mod: CI

## 2017-03-23 PROCEDURE — 80202 ASSAY OF VANCOMYCIN: CPT

## 2017-03-23 PROCEDURE — A9270 NON-COVERED ITEM OR SERVICE: HCPCS | Performed by: INTERNAL MEDICINE

## 2017-03-23 PROCEDURE — G8996 SWALLOW CURRENT STATUS: HCPCS | Mod: CK

## 2017-03-23 PROCEDURE — A9270 NON-COVERED ITEM OR SERVICE: HCPCS | Performed by: STUDENT IN AN ORGANIZED HEALTH CARE EDUCATION/TRAINING PROGRAM

## 2017-03-23 PROCEDURE — 82746 ASSAY OF FOLIC ACID SERUM: CPT

## 2017-03-23 RX ORDER — POTASSIUM CHLORIDE 7.45 MG/ML
10 INJECTION INTRAVENOUS ONCE
Status: COMPLETED | OUTPATIENT
Start: 2017-03-23 | End: 2017-03-23

## 2017-03-23 RX ORDER — FINASTERIDE 5 MG/1
5 TABLET, FILM COATED ORAL DAILY
Status: DISCONTINUED | OUTPATIENT
Start: 2017-03-23 | End: 2017-03-27 | Stop reason: HOSPADM

## 2017-03-23 RX ORDER — CALCITRIOL 0.25 UG/1
0.25 CAPSULE, LIQUID FILLED ORAL DAILY
Status: DISCONTINUED | OUTPATIENT
Start: 2017-03-23 | End: 2017-03-27 | Stop reason: HOSPADM

## 2017-03-23 RX ORDER — AMLODIPINE BESYLATE 5 MG/1
2.5 TABLET ORAL DAILY
Status: DISCONTINUED | OUTPATIENT
Start: 2017-03-23 | End: 2017-03-27 | Stop reason: HOSPADM

## 2017-03-23 RX ORDER — POTASSIUM CHLORIDE 1.5 G/1.58G
20 POWDER, FOR SOLUTION ORAL 2 TIMES DAILY
Status: DISCONTINUED | OUTPATIENT
Start: 2017-03-23 | End: 2017-03-27 | Stop reason: HOSPADM

## 2017-03-23 RX ORDER — WARFARIN SODIUM 3 MG/1
3 TABLET ORAL EVERY EVENING
Status: DISCONTINUED | OUTPATIENT
Start: 2017-03-23 | End: 2017-03-23

## 2017-03-23 RX ORDER — POTASSIUM CHLORIDE 20 MEQ/1
20 TABLET, EXTENDED RELEASE ORAL 2 TIMES DAILY
Status: DISCONTINUED | OUTPATIENT
Start: 2017-03-23 | End: 2017-03-23

## 2017-03-23 RX ADMIN — CHOLECALCIFEROL TAB 10 MCG (400 UNIT) 400 UNITS: 10 TAB at 12:24

## 2017-03-23 RX ADMIN — CALCIUM CARBONATE-CHOLECALCIFEROL TAB 250 MG-125 UNIT 2 TABLET: 250-125 TAB at 12:24

## 2017-03-23 RX ADMIN — VANCOMYCIN HYDROCHLORIDE 900 MG: 100 INJECTION, POWDER, LYOPHILIZED, FOR SOLUTION INTRAVENOUS at 22:32

## 2017-03-23 RX ADMIN — SERTRALINE 37.5 MG: 50 TABLET, FILM COATED ORAL at 12:26

## 2017-03-23 RX ADMIN — AMLODIPINE BESYLATE 2.5 MG: 5 TABLET ORAL at 12:25

## 2017-03-23 RX ADMIN — PIPERACILLIN AND TAZOBACTAM 3.38 G: 3; .375 INJECTION, POWDER, LYOPHILIZED, FOR SOLUTION INTRAVENOUS; PARENTERAL at 09:36

## 2017-03-23 RX ADMIN — POTASSIUM CHLORIDE 10 MEQ: 7.46 INJECTION, SOLUTION INTRAVENOUS at 05:15

## 2017-03-23 RX ADMIN — POTASSIUM CHLORIDE 20 MEQ: 1500 TABLET, EXTENDED RELEASE ORAL at 12:25

## 2017-03-23 RX ADMIN — PIPERACILLIN AND TAZOBACTAM 3.38 G: 3; .375 INJECTION, POWDER, LYOPHILIZED, FOR SOLUTION INTRAVENOUS; PARENTERAL at 15:00

## 2017-03-23 RX ADMIN — PIPERACILLIN AND TAZOBACTAM 3.38 G: 3; .375 INJECTION, POWDER, LYOPHILIZED, FOR SOLUTION INTRAVENOUS; PARENTERAL at 02:51

## 2017-03-23 RX ADMIN — FINASTERIDE 5 MG: 5 TABLET, FILM COATED ORAL at 12:24

## 2017-03-23 RX ADMIN — POTASSIUM CHLORIDE 20 MEQ: 1.5 POWDER, FOR SOLUTION ORAL at 21:57

## 2017-03-23 RX ADMIN — STANDARDIZED SENNA CONCENTRATE AND DOCUSATE SODIUM 2 TABLET: 8.6; 5 TABLET, FILM COATED ORAL at 12:25

## 2017-03-23 RX ADMIN — CALCITRIOL 0.25 MCG: 0.25 CAPSULE, LIQUID FILLED ORAL at 12:24

## 2017-03-23 ASSESSMENT — PAIN SCALES - GENERAL
PAINLEVEL_OUTOF10: 0

## 2017-03-23 ASSESSMENT — LIFESTYLE VARIABLES
ALCOHOL_USE: NO
EVER_SMOKED: YES

## 2017-03-23 ASSESSMENT — ENCOUNTER SYMPTOMS
NECK PAIN: 0
PALPITATIONS: 0
BACK PAIN: 0
COUGH: 0
SORE THROAT: 0
ABDOMINAL PAIN: 0
VOMITING: 0
TREMORS: 0
DIARRHEA: 0
SHORTNESS OF BREATH: 0
LOSS OF CONSCIOUSNESS: 0
NAUSEA: 0
PND: 0
HEARTBURN: 0
FALLS: 0
FOCAL WEAKNESS: 0
CONSTIPATION: 0
ORTHOPNEA: 0
BRUISES/BLEEDS EASILY: 0
CHILLS: 0
SEIZURES: 0
HEADACHES: 0
FEVER: 0
SPUTUM PRODUCTION: 0

## 2017-03-23 ASSESSMENT — CHA2DS2 SCORE
AGE 65 TO 74: NO
PRIOR STROKE OR TIA OR THROMBOEMBOLISM: NO
CHF OR LEFT VENTRICULAR DYSFUNCTION: NO
VASCULAR DISEASE: NO
AGE 75 OR GREATER: YES
CHA2DS2 VASC SCORE: 3
HYPERTENSION: YES
DIABETES: NO
SEX: MALE

## 2017-03-23 NOTE — ED NOTES
Med ynes updated and complete  Allergies reviewed.  Pt unable to communicate at this time.  Placed call to wife and discussed   Current medications and last doses taken.  Pt is currently taking an antibiotic.

## 2017-03-23 NOTE — THERAPY
"Speech Language Therapy Clinical Swallow Evaluation completed.  Functional Status: Patient currently NPO/no nutrition pending swallow evaluation. Per EMR, patient is not having procedure this AM and therefore, PO trials can be given. Per RN, patient had coughing with medications with liquid wash this AM and wife reports patient intermittent coughs at home. Patient sleeping, but rousing easily to verbal and tactile cues. Upon inspection, patient's oral cavity was noted to be very dry, with dried secretions tethered from posterior lingual surface to velar surface. Extensive oral care and oral suctioning via [place]ouer provided to patient by this SLP, with much improvement noted. Vocal quality was initially garbled, which was clear after oral care. Patient consumed PO trials of single ice chips, NTL via tsp, cup sip, and straw, purees, pudding, and thin liquids via tsp and cup sip. Patient presented with no overt s/sx of aspiration on ice chips, NTL, or purees. PO trials of pudding resulted in minimal oral residue, but residue cleared with cues for second swallow. PO trials of thin liquids resulted in increased wet/gurgly vocal quality and anterior bolus loss x2. Patient appeared to fatigue and no soft solids were trialed. Laryngeal elevation palpated as weak and initiation of swallow trigger was delayed, with patient intermittently presenting with \"swishing movement\" of bolus in oral cavity. At this time, patient appears at the level to start NTFL diet with 1:1 feeding assistance d/t suspected dementia, hx of L eye blindness, weakness, and poor coordination. RN and CNA aware. MD present for part of evaluation and gave verbal order to cancel cognitive evaluation, as patient has suspected dementia, is at baseline level of cognitive functioning per wife, and evaluation is not appropriate at this time. RN aware. SLP to follow. Thank you for the consult.     Recommendations - Diet: Diet / Liquid Recommendation: Nectar Thick " "Full Liquid                          Strategies: Strict 1:1 feeding  and Head of Bed at 90 Degrees                          Medication Administration: Medication Administration : Crush all Medications in Puree  Plan of Care: Will benefit from Speech Therapy 3 times per week  Post-Acute Therapy: Discharge to a transitional care facility for continued skilled therapy services. and Discharge to home with outpatient or home health for additional skilled therapy services (per patient's wishes and depending on POC; ? Possible hospice)     See \"Rehab Therapy-Acute\" Patient Summary Report for complete documentation.   "

## 2017-03-23 NOTE — CONSULTS
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  Pt had a perirectal abscess that was drained by an outside facility but has ongoing output from this location.  This has been the case for the past 3 weeks now.  He denies any recent fevers/chills or GI issues.  He lives with his wife at home with nursing assistance.  He is disoriented this morning and cannot given any coherent medical hx at this time.   Since admission by medical team he has been stable overnight, still with wound drainage at this time.       PMHx: Blind, HTN, A-Fib, Cardiac Pacemaker, BPH    PSHx: perirectal abscess drainage, left BKA    Meds: see med rec    NKDA    FamHx: no colon/rectal cancers, no other pertinent family history    SocHx: No Tob/Drugs/EtOH      ROS: negative except as above    Consitutional- above  HEENT- no visual changes, no sneezing or runny nose  Skin- no rashes or itching  Cardiovascular- no chest pain or palpatations  Respiratory- no SOB or cough  GI- above  - no dysurea  Neuro- no weakness or syncope  Musculoskeletal- no muscle or joint pain  Heme- no bleeding or bruising  Lymphatic- no enlarged nodes or previous splenectomy  Endocrine- No sweating or heat/cold intolerance  Allergy- No asthma or hives  Psychiatric- no depression or anxiety        Physical Exam:   AFVSS  A@O x3, NAD  NCAT, no scleral icterus  Neck nontender, no lymphadenopathy  Normal respiratory effort, no chest wall masses  RRR, 2+ pulses  Abdomen soft, no peritonitis, no masses  Rectal exam reveals and open abscess cavity anterior to the anal verge with some serosanguinous drainage and stool at this time.    Extremities warm and well perfused    Labs: WBC 5.5, Hct 33, Plt 136  Na 142, K 3.2, otherwise lytes wnl, Albumin 3.1  INR 4.4    Radiology: CT: 1.  Perianal fistula. Details of sphincter involvement cannot be determined on CT. No abscess is appreciated.    2.  Hiatal hernia.    3.   Diverticulosis.    4.  2.5 cm indeterminate hypodense left renal mass, possibly a hemorrhagic cyst. If clinically indicated, nonemergent renal protocol CT or MRI could be performed for further evaluation.    5.  Atherosclerosis.    6.  Small bilateral pleural effusions with adjacent atelectasis. Peribronchial thickening in the left lung base is consistent with inflammation/bronchitis.    7.  Cardiomegaly      A/P: 89y M with Perirectal abscess and likely Perianal Fistula as well.  At the present time he is disoriented and unable to follow medical advice, he has been picking at the wound  Per nursing records and not leaving packing in place.  He is unable to ambulate and take baths and do other perianal care which would be necessary to heal from any intervention.  Furthermore his INR is 4.4 this am, likely from recent coumadin usage.  The only surgery that I would recommend in his current stage is anorectal exam with draining seton placement into the fistula.  This could help control the drainage and would be minimally invasive at this time.  This is not feasible until his INR is normalized though.    Continue care per primary team.  Can re-consider operative exploration in a couple days if INR improved or schedule as an outpatient to address fistula.  Local wound care only for now.

## 2017-03-23 NOTE — ED NOTES
Pt not following orders to not touch his rectal area, pt pulled off brief and then pulled out packing that was placed in abscess causing feces to drain all over the bed and then pt put his hands into feces and smeared it over his legs and abdomen. Cleaned pt and changed linens, placed pt on disposable pad and then placed disposable pad on top of pt's private area in order for doctor to replace packing.  Notified Dr and was advised that it's ok to not have packing and to allow abscess to drain. Pt placed in soft wrist restraints to keep pt from scratching at abscess. Transportation at bedside. Attempt to call floor RN to give report, no answer.

## 2017-03-23 NOTE — SENIOR ADMIT NOTE
Patient is hard hearing and difficult to communicate with, minimall history was obtained.    89 yom with PMH of PM NICKI, pancytopenia, who was transferred from by his home health nurse and brought by EMS for worsening right gluteal abscess and questionable fistula and need for further wound care and surgical evaluation. CT did not show further progression of the abscess.Yet ED consulted surgery to rule out fistula and further care. He was started on vanc and zosyn in the ED. He denies fever, n/v/SOB and chest pain. Per patient he lives at home with his wife and has home nursing help.    Labs showed WBCs 2.5, hemoglobin 9.7, platelet 120, BUN 27, creatinine 1.08, and a 141, potassium 3.5 lactic acid 1.2  CXR showed cardiomegaly and basilar atelectasis    CT ABD  And pelvis w/c  - Perianal fistula. Details of sphincter involvement cannot be determined on CT. No abscess is appreciated  - 2.5 cm indeterminate hypodense left renal mass, possibly a hemorrhagic cyst. If clinically indicated, nonemergent renal protocol CT or MRI could be performed for further evaluation.  -  Small bilateral pleural effusions with adjacent atelectasis. Peribronchial thickening in the left lung base is consistent with inflammation/bronchitis.     /87 mmHg  Pulse 73  Resp 18  Ht 1.829 m (6')  Wt 90.719 kg (200 lb)  BMI 27.12 kg/m2  SpO2 96%    Patient not toxic, pale, not jaundiced or sandals  Chest is clear, normal S1-S2, no G MR, RRR  Abdomen soft nondistended and no organomegaly or rigidity  An open abscess which was packed on the right gluteal region about 2 cm from his anus was noted    Right gluteal abscess  Pancytopenia  Hypokalemia    Plan  Admit to inpatient  IV fluid at 150 mL per hour  Continue vanc and Unasyn  Blood and urine culture  HbA1c  Replace potassium and electrolytes as needed  Follow surgery recommendation  Thiamine folate adm MVI  Day team may consider hematology consult for pancytopenia   [facility] -could not [facility] remotely  Please See Dr Easton note for further details

## 2017-03-23 NOTE — CARE PLAN
Problem: Safety  Goal: Will remain free from injury  Outcome: PROGRESSING AS EXPECTED  Bed alarm used     Problem: Infection  Goal: Will remain free from infection  Outcome: PROGRESSING AS EXPECTED  Pt. Getting zosyn and vancomycin

## 2017-03-23 NOTE — CARE PLAN
Problem: Safety  Goal: Will remain free from injury  Outcome: PROGRESSING AS EXPECTED  Calling, unit routine, and bed alarm    Problem: Respiratory:  Goal: Respiratory status will improve  Outcome: PROGRESSING AS EXPECTED  continuous pulse ox and weaned oxygen.

## 2017-03-23 NOTE — PROGRESS NOTES
[facility] Internal Medicine Interval Note    Name Jimmy Perez     1925   Age/Sex 89 y.o. male   MRN [id number]   Code Status DNR     After 5PM or if no immediate response to page, please call for cross-coverage  Attending/Team: Srinath Pacheco Call [phone] to page   1st Call - Day Intern (R1):   Mckay Christie 2nd Call - Day Sr. 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       Consultants/Specialty  Palliative  Surgery  SW    Disposition  Inpatient on IV antibiotics    Quality Measures  EKG reviewed, Labs reviewed, Medications reviewed and Radiology images reviewed  Palma catheter: No Palma      DVT Prophylaxis: Warfarin (Coumadin)            Physical Exam       Filed Vitals:    17 0436 17 0800 17 1200 17 1400   BP: 128/92 137/86 125/89    Pulse: 71 70 68    Temp: 36.1 °C (97 °F) 36.1 °C (96.9 °F) 36.3 °C (97.3 °F)    Resp: 20 20 18    Height:       Weight:       SpO2: 99% 99% 99% 93%     Body mass index is 25.86 kg/(m^2).  Oxygen Therapy:  Pulse Oximetry: 93 %, O2 (LPM): 0, O2 Delivery: None (Room Air)    Physical Exam   Constitutional: He is oriented to person, place, and time and well-developed, well-nourished, and in no distress. No distress.   HENT:   Head: Normocephalic and atraumatic.   Eyes: Pupils are equal, round, and reactive to light. No scleral icterus.   Neck: No JVD present.   Cardiovascular: Normal rate and regular rhythm.    No murmur heard.  Pulmonary/Chest: Effort normal. No respiratory distress. He has no wheezes. He has no  rales.   Abdominal: He exhibits no distension. There is no tenderness.   Anal fistula noted, no fluctuant mass, minimal redness and irritation around anus. No dark or bright red stool   Musculoskeletal: He exhibits no edema or tenderness.   Neurological: He is alert and oriented to person, place, and time.   Skin: Skin is dry. He is not diaphoretic.   Nursing note and vitals reviewed.    Lab Data Review:  Recent Results (from the past 24 hour(s))   BLOOD CULTURE    Collection Time: 17  4:00 PM   Result Value Ref Range    Significant Indicator NEG     Source BLD     Site PERIPHERAL     Blood Culture       No Growth    Note: Blood cultures are incubated for 5 days and  are monitored continuously.Positive blood cultures  are called to the RN and reported as soon as  they are identified.     CULTURE WOUND W/ GRAM STAIN    Collection Time: 17  4:30 PM   Result Value Ref Range    Significant Indicator NEG     Source WND     Site Perianal Abscess     Culture Result Wound Light growth Mixed enteric yumiko.     Gram Stain Result Rare WBCs.  Rare Gram positive cocci.      GRAM STAIN    Collection Time: 17  4:30 PM   Result Value Ref Range    Significant Indicator .     Source WND     Site Perianal Abscess     Gram Stain Result Rare WBCs.  Rare Gram positive cocci.      EKG    Collection Time: 17  7:16 PM   Result Value Ref Range    Report       [facility] Emergency [facility]    Test Date:  2017  Pt Name:    LUDY HERBERT           Department: ER  MRN:        [id number]                      Room:        09  Gender:     M                            Technician: [id number]  :        [id number]                   Requested By:ABDIEL STARR  Order #:    [id number]                    Reading MD:    Measurements  Intervals                                Axis  Rate:       70                           P:          0  MO:         216                          QRS:        -77  QRSD:       162                           T:          103  QT:         460  QTc:        497    Interpretive Statements  VENTRICULAR-PACED COMPLEXES  NO FURTHER RHYTHM ANALYSIS ATTEMPTED DUE TO PACED RHYTHM  BORDERLINE AV CONDUCTION DELAY  CONSIDER LEFT ATRIAL ABNORMALITY  LEFT BUNDLE BRANCH BLOCK  BASELINE WANDER IN LEAD(S) III,aVL,V4  No previous ECG available for comparison     CBC with Differential    Collection Time: 03/23/17 12:13 AM   Result Value Ref Range    WBC 5.5 4.8 - 10.8 K/uL    RBC 3.88 (L) 4.70 - 6.10 M/uL    Hemoglobin 10.9 (L) 14.0 - 18.0 g/dL    Hematocrit 33.4 (L) 42.0 - 52.0 %    MCV 86.1 81.4 - 97.8 fL    MCH 28.1 27.0 - 33.0 pg    MCHC 32.6 (L) 33.7 - 35.3 g/dL    RDW 48.2 35.9 - 50.0 fL    Platelet Count 136 (L) 164 - 446 K/uL    MPV 10.2 9.0 - 12.9 fL    Neutrophils-Polys 77.20 (H) 44.00 - 72.00 %    Lymphocytes 6.50 (L) 22.00 - 41.00 %    Monocytes 14.30 (H) 0.00 - 13.40 %    Eosinophils 0.20 0.00 - 6.90 %    Basophils 0.20 0.00 - 1.80 %    Immature Granulocytes 1.60 (H) 0.00 - 0.90 %    Nucleated RBC 0.00 /100 WBC    Neutrophils (Absolute) 4.28 1.82 - 7.42 K/uL    Lymphs (Absolute) 0.36 (L) 1.00 - 4.80 K/uL    Monos (Absolute) 0.79 0.00 - 0.85 K/uL    Eos (Absolute) 0.01 0.00 - 0.51 K/uL    Baso (Absolute) 0.01 0.00 - 0.12 K/uL    Immature Granulocytes (abs) 0.09 0.00 - 0.11 K/uL    NRBC (Absolute) 0.00 K/uL   Comp Metabolic Panel (CMP)    Collection Time: 03/23/17 12:13 AM   Result Value Ref Range    Sodium 142 135 - 145 mmol/L    Potassium 3.2 (L) 3.6 - 5.5 mmol/L    Chloride 110 96 - 112 mmol/L    Co2 26 20 - 33 mmol/L    Anion Gap 6.0 0.0 - 11.9    Glucose 142 (H) 65 - 99 mg/dL    Bun 27 (H) 8 - 22 mg/dL    Creatinine 1.22 0.50 - 1.40 mg/dL    Calcium 9.6 8.5 - 10.5 mg/dL    AST(SGOT) 10 (L) 12 - 45 U/L    ALT(SGPT) 7 2 - 50 U/L    Alkaline Phosphatase 58 30 - 99 U/L    Total Bilirubin 0.8 0.1 - 1.5 mg/dL    Albumin 3.1 (L) 3.2 - 4.9 g/dL    Total Protein 6.2 6.0 - 8.2 g/dL    Globulin 3.1 1.9 - 3.5  g/dL    A-G Ratio 1.0 g/dL   ESTIMATED GFR    Collection Time: 03/23/17 12:13 AM   Result Value Ref Range    GFR If African American >60 >60 mL/min/1.73 m 2    GFR If Non  56 (A) >60 mL/min/1.73 m 2   FOLATE    Collection Time: 03/23/17  2:11 AM   Result Value Ref Range    Folate -Folic Acid 13.7 >4.0 ng/mL   VITAMIN B12    Collection Time: 03/23/17  2:11 AM   Result Value Ref Range    Vitamin B12 -True Cobalamin 1312 (H) 211 - 911 pg/mL   VITAMIN D,25 HYDROXY    Collection Time: 03/23/17  2:11 AM   Result Value Ref Range    25-Hydroxy   Vitamin D 25 21 (L) 30 - 100 ng/mL   PROTHROMBIN TIME    Collection Time: 03/23/17  2:11 AM   Result Value Ref Range    PT 43.2 (H) 12.0 - 14.6 sec    INR 4.38 (H) 0.87 - 1.13   PREALBUMIN    Collection Time: 03/23/17  2:11 AM   Result Value Ref Range    Pre-Albumin 11.0 (L) 18.0 - 38.0 mg/dL   TSH WITH REFLEX TO FT4    Collection Time: 03/23/17  2:11 AM   Result Value Ref Range    TSH 1.470 0.300 - 3.700 uIU/mL       Assessment/Plan   No problem-specific assessment & plan notes found for this encounter.    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 Questions answered and it was collectively decided that hospice would be a good option for this man. Referral has been placed. He does not want invasive measures and explicitly states he would not like to have surgery. He reinforced his DNR status. We did discussed the risks/benefets and alternative treatments available to him. In particular we discussed the risks of hospital stays (worsening delirium, infection) and antibiotic exposure, in particular c diff diarrhea which would be an unfortunate complication in the setting of poorly healing fistula. I have  discontinued the Zosyn started on admission as I believe risks do outweigh what little benefit may exist. I did emphasize that surgery is definitive treatment for his anal fistula and will discuss minimally invasive options that are consistent with patients goals of care.     He did have positive blood cultures, MRSA not excluded so reasonable to continue IV vancomycin for now and he will need to be in the hospital for this for the time being. He is non-toxic on clinical exam and may be reasonable to discontinue vancomycin in the coming days as well if doing well clinically.    PLAN  - Continue vancomycin, discontinue zosyn  - Continue wound care  - Hold coumadin  - Consider minimally invasive treatment options for anal fistula  - SLP eval  - Hospice referral placed

## 2017-03-23 NOTE — PROGRESS NOTES
Patient oriented to person, time and event. Patient lethargic during assessment.  6 liters oxygen via mask. Oxygen weaned throughout shift. Open wound to perianal area open to air. Patient brought up from ER on restraints with no order. Restraints removed at this time with no complications. Left BKA noted at this time. Discoloration to right leg also noted. Bed alarm on. Patient NPO and anticoagulants held for possible surgery tomorrow. Call light within reach. Bed locked and in low position. Patient encouraged to call with any needs/concerns.

## 2017-03-23 NOTE — PROGRESS NOTES
Pharmacy Kinetics 89 y.o. male on vancomycin day # 1 3/22/2017    Currently on Vancomycin 2300 mg iv x 1 loading dose given at 16:00    Indication for Treatment: SSTI    Pertinent history per medical record: Admitted on 3/22/2017 for worsening abscess on leg that is now also on rectum.  Failed on outpatient PO antibiotic (doxycycline).  Empiric antibiotics initiated for SSTI.    Other antibiotics: Zosyn 3.375 g IV q6h    Allergies: Review of patient's allergies indicates no known allergies.     List concerns for renal function: BUN/SCr ratio > 20:1, elevated SCr (1.08), advanced age (89), contrast with CT     Pertinent cultures to date:   03/22/17 wound culture in process  03/22/17 BC x 2 in process    Recent Labs      03/22/17   1513   WBC  2.5*   NEUTSPOLYS  62.50     Recent Labs      03/22/17   1513   BUN  27*   CREATININE  1.08   ALBUMIN  3.2     No results for input(s): VANCOTROUGH, VANCOPEAK, VANCORANDOM in the last 72 hours.No intake or output data in the 24 hours ending 03/22/17 1941   Blood pressure 133/87, pulse 69, resp. rate 26, height 1.829 m (6'), weight 90.719 kg (200 lb), SpO2 93 %.     A/P   1. Vancomycin dose change: pulse dose  2. Next vancomycin level: clinical pharmacist to determine  3. Goal trough: 12 - 16 mcg/mL  4. Comments: Multiple concerns for accumulation.  Daytime clinical pharmacist to follow up, determine when next level is to be collected, and make changes as clinical status dictates.  Pharmacy will continue to monitor and adjust or de-escalate as appropriate.        Dariela Mcgee [facility]

## 2017-03-23 NOTE — PALLIATIVE CARE
"Palliative Care follow-up  PC RN met with patient, his wife, friend and MD at bedside. The team ensured the patient and family were up to speed on the current situation and discussed the patient's wishes. Dr. Lanza provided medical updates as needed. Jimmy does exhibit some confusion but was able to participate in the conversation. The patient and wife expressed that surgery was not in line with their goals and when asked about going home, the patient said \"Give it to me straight. Is it life or death?\" PC RN stated that some of the treatments/procedures available to him may be more burdensome than beneficial. He expressed \"having a good life\" and being okay with EOL. Renetta felt that hospice was also most appropriate and had had discussions about hospice in the past with Jimmy and the family, so she felt comfortable with this decision. She wishes to utilized [facility] as [facility] are their current home health provider. PC RN inquired about advance directives since [facility]. She expressed that she believed Jimmy's son was the POA and she plans to call PC with hsi phone number when she gets home this evening. PC RN will confirm with Jimmy's son the plan for returning home with hospice to ensure he is in agreement. Renetta denied having any further questions but was left with the PC RN contact information in the event something arose.    Updated: DONNY/MD    Plan: Home with hospice once verified and accepted. [facility] will be necessary for transport    Thank you for allowing Palliative Care to participate in this patient's care. Please feel free to call [phone] with any questions or concerns.  "

## 2017-03-23 NOTE — PROGRESS NOTES
Confused to situation and place. VSS. Pt. Getting IV antibiotics. Wound care consult . Surgery consulted reccommended no surgery until INR is back with in normal limits. Hospice care consulter per family request. Pt. Denies pain.

## 2017-03-23 NOTE — DISCHARGE PLANNING
MAXINE Pedroza with Pallative informed SW that pt's wife would like to use [facility].  Choice forms were signed and was faxed to ISABELA Green.

## 2017-03-23 NOTE — PALLIATIVE CARE
Palliative Care follow-up  [facility] to determine if they have an [facility] directive on file for this patient.    Plan: assist with POC with family and patient as able    Thank you for allowing Palliative Care to participate in this patient's care. Please feel free to call [phone] with any questions or concerns.

## 2017-03-23 NOTE — CONSULTS
"Reason for PC Consult: Advance Care Planning    Consulted by: Dr. Lanza, DAYNA Pacheco    Assessment:  General: 89 y/o male admitted with perianal abscess and fistula. He had wound care at home for the past 2 weeks but the wound healing has not been progressing as hoped. He and Renetta were  in 2010 per her statement. PMHx of dementia, afib, pacemaker placement, L AKA, possible TIA and BPH on chronic anticoagulation. Current INR 4.4.     Dyspnea: No-    Last BM: 03/23/17-    Pain: Unable to determine-    Depression: Unable to determine-      Spiritual:  Is Taoism or spirituality important for coping with this illness? Unable to determine-    Has a  or spiritual provider visit been requested? Unable to determine    Palliative Performance Scale: 30%    Advance Directive: None-    DPOA: No; NOK is wife Renetta  POLST: No-      Code Status: DNR-      Outcome:  Consult received and reviewed with the MD and BS RN; patient is confused and unable to participate so a call was placed to his wife. He is confused at baseline with worsening mentation at different points of the day, but overall has been declining over the past year per his wife. He lives with Renetta and has 2 kids from a previous marriage that live in [place]. Renetta knew the fistula was a possibility from talking with Jimmy's doctors and the wound care RN. She expressed concern for his quality of life and feels that Jimmy would be in agreement with whatever the MD feels is most appropriate for him and she would concur. She's not interested in procedures that will not enhance his QOL and worries about his decline at home. PC assessed her understanding of hospice and she stated \"we tried to get him hospice in January but they wouldn't accept him.\" PC RN validated her statement and assured her that this admission is different, so they could re-evaluate his eligibility. She expressed understanding. Renetta is planning to arrive to the " hospital around 3 PM and PC will meet with her along with the MD to discuss goals/POC. Jimmy had previously been with [facility].    Updated: JENNIFER RN/MD/DONNY    Plan: meet with Renetta to discuss goals/POC    Thank you for allowing Palliative Care to participate in this patient's care. Please feel free to call [phone] with any questions or concerns.

## 2017-03-23 NOTE — PROGRESS NOTES
Pharmacy Kinetics 89 y.o. male on vancomycin day # 2 3/23/2017    Currently on Vancomycin 900 mg iv once    Indication for Treatment: SSTI, Staph bacteremia    Pertinent history per medical record: Admitted on 3/22/2017 for for worsening abscess on leg that is now also on rectum.  Failed on outpatient PO antibiotic (doxycycline) for SSTI -perianal abscess. Staph bacteremia from 3/22 cultures developed.  .    Other antibiotics: Zosyn 3.375 g IV q6h    Allergies: Review of patient's allergies indicates no known allergies.     List concerns for renal function: BUN/SCr ratio > 20:1, elevated SCr (1.08), advanced age (89), contrast with CT     Pertinent cultures to date:   17 wound culture - rare GPC  17 BC x 2 - positive for Staph    Recent Labs      17   1513  17   0013   WBC  2.5*  5.5   NEUTSPOLYS  62.50  77.20*     Recent Labs      17   1513  17   0013   BUN  27*  27*   CREATININE  1.08  1.22   ALBUMIN  3.2  3.1*     Recent Labs      17   1544   VANCOTROUGH  16.2     Intake/Output Summary (Last 24 hours) at 17 1638  Last data filed at 17 1300   Gross per 24 hour   Intake    840 ml   Output      0 ml   Net    840 ml      Blood pressure 127/85, pulse 70, temperature 36.4 °C (97.6 °F), resp. rate 18, height 1.829 m (6'), weight 86.5 kg (190 lb 11.2 oz), SpO2 97 %. Temp (24hrs), Av.2 °C (97.2 °F), Min:36.1 °C (96.9 °F), Max:36.4 °C (97.6 °F)      A/P   1. Vancomycin dose change: 900mg IV once to start 2100  2. Next vancomycin level: a 24hr level (not ordered)  3. Goal trough: 16-20 mcg/mL  4. Comments: 24 hour trough after loading dose is 16.2; there is major concern for further accumulation.  Renal function has declined - continue to monitor. Blood cultures are positive for Staph not speciated - monitor to narrow therapy. If MRSA consider switching agents d/t age and renal function.       Mireya Mason, PharmD.  Pharmacy Practice Resident, PGY1

## 2017-03-24 PROBLEM — K61.0 PERIANAL ABSCESS: Status: ACTIVE | Noted: 2017-03-24

## 2017-03-24 PROBLEM — R78.81 BACTEREMIA DUE TO GRAM-POSITIVE BACTERIA: Status: RESOLVED | Noted: 2017-03-23 | Resolved: 2017-03-24

## 2017-03-24 LAB
ALBUMIN SERPL BCP-MCNC: 3 G/DL (ref 3.2–4.9)
ALBUMIN/GLOB SERPL: 1.2 G/DL
ALP SERPL-CCNC: 53 U/L (ref 30–99)
ALT SERPL-CCNC: 6 U/L (ref 2–50)
ANION GAP SERPL CALC-SCNC: 4 MMOL/L (ref 0–11.9)
AST SERPL-CCNC: 10 U/L (ref 12–45)
BACTERIA UR CULT: NORMAL
BACTERIA WND AEROBE CULT: NORMAL
BASOPHILS # BLD AUTO: 0.6 % (ref 0–1.8)
BASOPHILS # BLD: 0.02 K/UL (ref 0–0.12)
BILIRUB SERPL-MCNC: 0.7 MG/DL (ref 0.1–1.5)
BUN SERPL-MCNC: 24 MG/DL (ref 8–22)
CALCIUM SERPL-MCNC: 9.1 MG/DL (ref 8.5–10.5)
CHLORIDE SERPL-SCNC: 115 MMOL/L (ref 96–112)
CO2 SERPL-SCNC: 25 MMOL/L (ref 20–33)
CREAT SERPL-MCNC: 1.21 MG/DL (ref 0.5–1.4)
EKG IMPRESSION: NORMAL
EOSINOPHIL # BLD AUTO: 0.03 K/UL (ref 0–0.51)
EOSINOPHIL NFR BLD: 0.9 % (ref 0–6.9)
ERYTHROCYTE [DISTWIDTH] IN BLOOD BY AUTOMATED COUNT: 51.4 FL (ref 35.9–50)
GFR SERPL CREATININE-BSD FRML MDRD: 56 ML/MIN/1.73 M 2
GLOBULIN SER CALC-MCNC: 2.6 G/DL (ref 1.9–3.5)
GLUCOSE SERPL-MCNC: 106 MG/DL (ref 65–99)
GRAM STN SPEC: NORMAL
HCT VFR BLD AUTO: 31.5 % (ref 42–52)
HGB BLD-MCNC: 9.9 G/DL (ref 14–18)
IMM GRANULOCYTES # BLD AUTO: 0.04 K/UL (ref 0–0.11)
IMM GRANULOCYTES NFR BLD AUTO: 1.2 % (ref 0–0.9)
INR PPP: 6.06 (ref 0.87–1.13)
LYMPHOCYTES # BLD AUTO: 0.64 K/UL (ref 1–4.8)
LYMPHOCYTES NFR BLD: 19.4 % (ref 22–41)
MAGNESIUM SERPL-MCNC: 1.9 MG/DL (ref 1.5–2.5)
MCH RBC QN AUTO: 27.9 PG (ref 27–33)
MCHC RBC AUTO-ENTMCNC: 31.4 G/DL (ref 33.7–35.3)
MCV RBC AUTO: 88.7 FL (ref 81.4–97.8)
MONOCYTES # BLD AUTO: 0.51 K/UL (ref 0–0.85)
MONOCYTES NFR BLD AUTO: 15.5 % (ref 0–13.4)
NEUTROPHILS # BLD AUTO: 2.06 K/UL (ref 1.82–7.42)
NEUTROPHILS NFR BLD: 62.4 % (ref 44–72)
NRBC # BLD AUTO: 0 K/UL
NRBC BLD AUTO-RTO: 0 /100 WBC
PHOSPHATE SERPL-MCNC: 1.9 MG/DL (ref 2.5–4.5)
PLATELET # BLD AUTO: 122 K/UL (ref 164–446)
PMV BLD AUTO: 10.1 FL (ref 9–12.9)
POTASSIUM SERPL-SCNC: 3.5 MMOL/L (ref 3.6–5.5)
PREALB SERPL-MCNC: 9 MG/DL (ref 18–38)
PROT SERPL-MCNC: 5.6 G/DL (ref 6–8.2)
PROTHROMBIN TIME: 55.8 SEC (ref 12–14.6)
RBC # BLD AUTO: 3.55 M/UL (ref 4.7–6.1)
SIGNIFICANT IND 70042: NORMAL
SIGNIFICANT IND 70042: NORMAL
SITE SITE: NORMAL
SITE SITE: NORMAL
SODIUM SERPL-SCNC: 144 MMOL/L (ref 135–145)
SOURCE SOURCE: NORMAL
SOURCE SOURCE: NORMAL
VANCOMYCIN SERPL-MCNC: 14 UG/ML
WBC # BLD AUTO: 3.3 K/UL (ref 4.8–10.8)

## 2017-03-24 PROCEDURE — 85025 COMPLETE CBC W/AUTO DIFF WBC: CPT

## 2017-03-24 PROCEDURE — 700105 HCHG RX REV CODE 258: Performed by: STUDENT IN AN ORGANIZED HEALTH CARE EDUCATION/TRAINING PROGRAM

## 2017-03-24 PROCEDURE — 700102 HCHG RX REV CODE 250 W/ 637 OVERRIDE(OP): Performed by: STUDENT IN AN ORGANIZED HEALTH CARE EDUCATION/TRAINING PROGRAM

## 2017-03-24 PROCEDURE — 770006 HCHG ROOM/CARE - MED/SURG/GYN SEMI*

## 2017-03-24 PROCEDURE — 84100 ASSAY OF PHOSPHORUS: CPT

## 2017-03-24 PROCEDURE — 85610 PROTHROMBIN TIME: CPT

## 2017-03-24 PROCEDURE — 80053 COMPREHEN METABOLIC PANEL: CPT

## 2017-03-24 PROCEDURE — 700111 HCHG RX REV CODE 636 W/ 250 OVERRIDE (IP): Performed by: PHARMACIST

## 2017-03-24 PROCEDURE — 36415 COLL VENOUS BLD VENIPUNCTURE: CPT

## 2017-03-24 PROCEDURE — 99232 SBSQ HOSP IP/OBS MODERATE 35: CPT | Mod: GC | Performed by: INTERNAL MEDICINE

## 2017-03-24 PROCEDURE — 80202 ASSAY OF VANCOMYCIN: CPT

## 2017-03-24 PROCEDURE — 83735 ASSAY OF MAGNESIUM: CPT

## 2017-03-24 PROCEDURE — 84134 ASSAY OF PREALBUMIN: CPT

## 2017-03-24 PROCEDURE — 51798 US URINE CAPACITY MEASURE: CPT

## 2017-03-24 PROCEDURE — A9270 NON-COVERED ITEM OR SERVICE: HCPCS | Performed by: INTERNAL MEDICINE

## 2017-03-24 PROCEDURE — 700102 HCHG RX REV CODE 250 W/ 637 OVERRIDE(OP): Performed by: INTERNAL MEDICINE

## 2017-03-24 PROCEDURE — A9270 NON-COVERED ITEM OR SERVICE: HCPCS | Performed by: STUDENT IN AN ORGANIZED HEALTH CARE EDUCATION/TRAINING PROGRAM

## 2017-03-24 PROCEDURE — 700105 HCHG RX REV CODE 258: Performed by: PHARMACIST

## 2017-03-24 RX ORDER — POTASSIUM CHLORIDE 1.5 G/1.58G
20 POWDER, FOR SOLUTION ORAL ONCE
Status: COMPLETED | OUTPATIENT
Start: 2017-03-24 | End: 2017-03-24

## 2017-03-24 RX ORDER — POTASSIUM CHLORIDE 20 MEQ/1
20 TABLET, EXTENDED RELEASE ORAL ONCE
Status: DISCONTINUED | OUTPATIENT
Start: 2017-03-24 | End: 2017-03-24

## 2017-03-24 RX ORDER — SODIUM CHLORIDE 9 MG/ML
1000 INJECTION, SOLUTION INTRAVENOUS CONTINUOUS
Status: DISCONTINUED | OUTPATIENT
Start: 2017-03-24 | End: 2017-03-25

## 2017-03-24 RX ADMIN — POTASSIUM CHLORIDE 20 MEQ: 1.5 POWDER, FOR SOLUTION ORAL at 21:22

## 2017-03-24 RX ADMIN — SODIUM CHLORIDE 1000 ML: 9 INJECTION, SOLUTION INTRAVENOUS at 21:25

## 2017-03-24 RX ADMIN — SERTRALINE 37.5 MG: 50 TABLET, FILM COATED ORAL at 08:10

## 2017-03-24 RX ADMIN — CALCIUM CARBONATE-CHOLECALCIFEROL TAB 250 MG-125 UNIT 2 TABLET: 250-125 TAB at 08:14

## 2017-03-24 RX ADMIN — CALCITRIOL 0.25 MCG: 0.25 CAPSULE, LIQUID FILLED ORAL at 08:14

## 2017-03-24 RX ADMIN — DIBASIC SODIUM PHOSPHATE, MONOBASIC POTASSIUM PHOSPHATE AND MONOBASIC SODIUM PHOSPHATE 1 TABLET: 852; 155; 130 TABLET ORAL at 08:14

## 2017-03-24 RX ADMIN — DIBASIC SODIUM PHOSPHATE, MONOBASIC POTASSIUM PHOSPHATE AND MONOBASIC SODIUM PHOSPHATE 1 TABLET: 852; 155; 130 TABLET ORAL at 12:17

## 2017-03-24 RX ADMIN — FINASTERIDE 5 MG: 5 TABLET, FILM COATED ORAL at 08:14

## 2017-03-24 RX ADMIN — DIBASIC SODIUM PHOSPHATE, MONOBASIC POTASSIUM PHOSPHATE AND MONOBASIC SODIUM PHOSPHATE 1 TABLET: 852; 155; 130 TABLET ORAL at 17:52

## 2017-03-24 RX ADMIN — AMLODIPINE BESYLATE 2.5 MG: 5 TABLET ORAL at 08:12

## 2017-03-24 RX ADMIN — DIBASIC SODIUM PHOSPHATE, MONOBASIC POTASSIUM PHOSPHATE AND MONOBASIC SODIUM PHOSPHATE 1 TABLET: 852; 155; 130 TABLET ORAL at 21:29

## 2017-03-24 RX ADMIN — VANCOMYCIN HYDROCHLORIDE 1000 MG: 100 INJECTION, POWDER, LYOPHILIZED, FOR SOLUTION INTRAVENOUS at 22:28

## 2017-03-24 RX ADMIN — POTASSIUM CHLORIDE 20 MEQ: 1.5 POWDER, FOR SOLUTION ORAL at 08:17

## 2017-03-24 RX ADMIN — CHOLECALCIFEROL TAB 10 MCG (400 UNIT) 400 UNITS: 10 TAB at 08:09

## 2017-03-24 ASSESSMENT — ENCOUNTER SYMPTOMS
DIZZINESS: 0
BLURRED VISION: 0
PALPITATIONS: 0
MYALGIAS: 0
CHILLS: 0
DEPRESSION: 0
COUGH: 0
FEVER: 0
NAUSEA: 0
HEADACHES: 0
BRUISES/BLEEDS EASILY: 0

## 2017-03-24 ASSESSMENT — PAIN SCALES - GENERAL
PAINLEVEL_OUTOF10: 3
PAINLEVEL_OUTOF10: 0
PAINLEVEL_OUTOF10: 4
PAINLEVEL_OUTOF10: 0
PAINLEVEL_OUTOF10: 4

## 2017-03-24 NOTE — PROGRESS NOTES
Pt is AO to self and confused about his surroundings. Unaware of where he is, he is startled by birds flying by and thinks the sink area is another room. Pt is happiest when wife is around. Pt does well if he is approached gently and care is given with explanation. Arms are sore so he is not happy if they are touched w/o warning.

## 2017-03-24 NOTE — PROGRESS NOTES
Abdoul from Lab called with critical result of PT/INR at 55.8/6.06. Critical lab result read back to Abdoul.   Dr. Nugent notified of critical lab result at 55.8/6.06.  Critical lab result read back by Dr. Nugent.

## 2017-03-24 NOTE — PROGRESS NOTES
Inpatient Anticoagulation Service Note    Date: 3/23/2017  Reason for Anticoagulation: Atrial Fibrillation   [id number] VASc Score: 3    Hemoglobin Value: 10.9  Hematocrit Value: 33.4  Lab Platelet Value: 136  Target INR: 2.0 to 2.5    INR from last 7 days     Date/Time INR Value    03/23/17 0211 (!)4.38        Dose from last 7 days     Date/Time Dose (mg)    03/23/17 0800 0        Average Dose (mg): 3  Significant Interactions: Antibiotics  Bridge Therapy: No    Comments: INR supra-therapeutic. Hold warfarin today with INR check tomorrow. Normal home dose appears to be 3mg daily per medrec. CHADsVasc is 3 with no prior DVT/PE/Stroke hx. H/H is stable with no indication of bleeding noted. No new DDI. Pt is trying to go hospice.      Plan:  Hold warfarin d/t supra-therapeutic INR  Education Material Provided?: No (Chronic warfarin therapy)  Pharmacist suggested discharge dosing: Hold warfarin for 2 days then restart home dose of 3mg daily with close f/u in 3 days     Mireya Mason, PharmD.  Pharmacy Practice Resident, PGY1

## 2017-03-24 NOTE — PROGRESS NOTES
Assumed care at 1915.  Report received from day shift RN. Pt confused, A&Ox1, oriented to self only.  Pt denies pain at this time.  Pt educated on hourly rounding and phone extension numbers. Pt board has been updated. Pt denies any additional needs at this time.  Bed alarm set, call light and phone within reach.

## 2017-03-24 NOTE — PROGRESS NOTES
Inpatient Anticoagulation Service Note    Date: 3/24/2017  Reason for Anticoagulation: Atrial Fibrillation   [id number] VASc Score: 3    Hemoglobin Value: 9.9  Hematocrit Value: 31.5  Lab Platelet Value: 122  Target INR: 2.0 to 2.5    INR from last 7 days     Date/Time INR Value    03/24/17 0228 (!)6.06    03/23/17 0211 (!)4.38        Dose from last 7 days     Date/Time Dose (mg)    03/24/17 0800 0    03/23/17 0800 0        Average Dose (mg): 3  Significant Interactions: Antibiotics  Bridge Therapy: No     Comments: INR supratherapeutic. Hold warfarin. No new DDI. H/H is stable with no indication of bleeding noted. Anticipate Warfarin being held for a few days. INR check tomorrow.      Plan:  Hold warfarin for at least 2 days.  Education Material Provided?: No (Chronic warfarin therapy)  Pharmacist suggested discharge dosing: Hold warfarin for 3 days then restart home dose of 3mg daily with close f/u in 3 days     Mireya Mason, PharmD.  Pharmacy Practice Resident, PGY1

## 2017-03-24 NOTE — PROGRESS NOTES
Pt is pleasant. AO to self. Had a good appetite. Does like to suction his mouth often. Can explain what he wants but at times it takes him awhile. Other times he just can't say what it is he wants. He can attempt to crawl out of bed. Swallowing well with the nectar thickened food/drink.  Pt did have 2 large BMs today. Bladder scan reordered through traction. Pt does appear to be urinating sufficiently w/condom catheter on.

## 2017-03-24 NOTE — PALLIATIVE CARE
Palliative Care follow-up  Nga Hartman ( [phone]) called and left msg on PC voice mail with pt's children's numbers    Son Dagoberto ([phone])  Daughter Rodolfo ([phone])        Thank you for allowing Palliative Care to participate in this patient's care. Please feel free to call [phone] with any questions or concerns.

## 2017-03-24 NOTE — DISCHARGE PLANNING
CHRISTOPH with [facility] stated that the agency has accepted the pt.  CHRISTOPH would like to know how the pt will be on IV antibiotics.  DONNY paged [facility] white team and is waiting for a call back.

## 2017-03-24 NOTE — PROGRESS NOTES
[facility] Internal Medicine Interval Note    Name Jimmy Perez     1925   Age/Sex 89 y.o. male   MRN [id number]   Code Status DNR     After 5PM or if no immediate response to page, please call for cross-coverage  Attending/Team: Srinath Pacheco Call [phone] to page   1st Call - Day Intern (R1):   Mckay Christie 2nd Call - Day Sr. 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 Hold coumadin.     Review of Systems   Constitutional: Negative for fever and chills.   Eyes: Negative for blurred vision.   Respiratory: Negative for cough.    Cardiovascular: Negative for chest pain and palpitations.   Gastrointestinal: Negative for nausea.   Genitourinary: Negative for dysuria.   Musculoskeletal: Negative for myalgias.   Skin: Negative for rash.   Neurological: Negative for dizziness and headaches.   Endo/Heme/Allergies: Does not bruise/bleed easily.   Psychiatric/Behavioral: Negative for depression.     Consultants/Specialty  Palliative  Surgery  SW    Disposition  Medically stable for discharge to hospice care. [facility] stated that the agency has accepted the patient. He does not require IV antibiotics.     Quality Measures  EKG reviewed, Labs reviewed, Medications reviewed and Radiology images reviewed  Palma catheter: No Palma      DVT Prophylaxis: Warfarin (Coumadin)            Physical Exam       Filed Vitals:    17 1600 17 2000 17 0400 17 0621   BP: 127/85 118/72 136/86 142/78   Pulse: 70 70 96 92   Temp: 36.4 °C (97.6 °F) 36.4 °C (97.5 °F) 36.2 °C (97.2 °F) 36.1 °C (97 °F)   Resp: 18 19 18 18   Height:       Weight:       SpO2: 97% 95% 99% 98%     Body mass index is 25.86  kg/(m^2).  Oxygen Therapy:  Pulse Oximetry: 98 %, O2 (LPM): 1, O2 Delivery: Silicone Nasal Cannula    Physical Exam   Constitutional: He is oriented to person, place, and time and well-developed, well-nourished, and in no distress. No distress.   HENT:   Head: Normocephalic and atraumatic.   Eyes: Pupils are equal, round, and reactive to light. No scleral icterus.   Neck: No JVD present.   Cardiovascular: Normal rate and regular rhythm.    No murmur heard.  Pulmonary/Chest: Effort normal. No respiratory distress. He has no wheezes. He has no rales.   Abdominal: He exhibits no distension. There is no tenderness.   Anal fistula noted, no fluctuant mass, minimal redness and irritation around anus. No dark or bright red stool   Musculoskeletal: He exhibits no edema or tenderness.   Neurological: He is alert and oriented to person, place, and time.   Skin: Skin is dry. He is not diaphoretic.   Nursing note and vitals reviewed.    Lab Data Review:  Recent Results (from the past 24 hour(s))   VANCOMYCIN TROUGH LEVEL    Collection Time: 03/23/17  3:44 PM   Result Value Ref Range    Vancomycin Trough 16.2 10.0 - 20.0 ug/mL   PROTHROMBIN TIME    Collection Time: 03/24/17  2:28 AM   Result Value Ref Range    PT 55.8 (HH) 12.0 - 14.6 sec    INR 6.06 (H) 0.87 - 1.13   CBC WITH DIFFERENTIAL    Collection Time: 03/24/17  2:28 AM   Result Value Ref Range    WBC 3.3 (L) 4.8 - 10.8 K/uL    RBC 3.55 (L) 4.70 - 6.10 M/uL    Hemoglobin 9.9 (L) 14.0 - 18.0 g/dL    Hematocrit 31.5 (L) 42.0 - 52.0 %    MCV 88.7 81.4 - 97.8 fL    MCH 27.9 27.0 - 33.0 pg    MCHC 31.4 (L) 33.7 - 35.3 g/dL    RDW 51.4 (H) 35.9 - 50.0 fL    Platelet Count 122 (L) 164 - 446 K/uL    MPV 10.1 9.0 - 12.9 fL    Neutrophils-Polys 62.40 44.00 - 72.00 %    Lymphocytes 19.40 (L) 22.00 - 41.00 %    Monocytes 15.50 (H) 0.00 - 13.40 %    Eosinophils 0.90 0.00 - 6.90 %    Basophils 0.60 0.00 - 1.80 %    Immature Granulocytes 1.20 (H) 0.00 - 0.90 %    Nucleated RBC 0.00 /100  WBC    Neutrophils (Absolute) 2.06 1.82 - 7.42 K/uL    Lymphs (Absolute) 0.64 (L) 1.00 - 4.80 K/uL    Monos (Absolute) 0.51 0.00 - 0.85 K/uL    Eos (Absolute) 0.03 0.00 - 0.51 K/uL    Baso (Absolute) 0.02 0.00 - 0.12 K/uL    Immature Granulocytes (abs) 0.04 0.00 - 0.11 K/uL    NRBC (Absolute) 0.00 K/uL   COMP METABOLIC PANEL    Collection Time: 03/24/17  2:28 AM   Result Value Ref Range    Sodium 144 135 - 145 mmol/L    Potassium 3.5 (L) 3.6 - 5.5 mmol/L    Chloride 115 (H) 96 - 112 mmol/L    Co2 25 20 - 33 mmol/L    Anion Gap 4.0 0.0 - 11.9    Glucose 106 (H) 65 - 99 mg/dL    Bun 24 (H) 8 - 22 mg/dL    Creatinine 1.21 0.50 - 1.40 mg/dL    Calcium 9.1 8.5 - 10.5 mg/dL    AST(SGOT) 10 (L) 12 - 45 U/L    ALT(SGPT) 6 2 - 50 U/L    Alkaline Phosphatase 53 30 - 99 U/L    Total Bilirubin 0.7 0.1 - 1.5 mg/dL    Albumin 3.0 (L) 3.2 - 4.9 g/dL    Total Protein 5.6 (L) 6.0 - 8.2 g/dL    Globulin 2.6 1.9 - 3.5 g/dL    A-G Ratio 1.2 g/dL   PREALBUMIN    Collection Time: 03/24/17  2:28 AM   Result Value Ref Range    Pre-Albumin 9.0 (L) 18.0 - 38.0 mg/dL   MAGNESIUM    Collection Time: 03/24/17  2:28 AM   Result Value Ref Range    Magnesium 1.9 1.5 - 2.5 mg/dL   PHOSPHORUS    Collection Time: 03/24/17  2:28 AM   Result Value Ref Range    Phosphorus 1.9 (L) 2.5 - 4.5 mg/dL   ESTIMATED GFR    Collection Time: 03/24/17  2:28 AM   Result Value Ref Range    GFR If African American >60 >60 mL/min/1.73 m 2    GFR If Non  56 (A) >60 mL/min/1.73 m 2     PROBLEM LIST  Active Hospital Problems    Diagnosis   • *Anal fistula [K60.3]   • Bacteremia due to Gram-positive bacteria [A49.9]   • Delirium superimposed on dementia [F05]   • Supratherapeutic INR [R79.1]     ASSESSMENT:89y M with perirectal abscess and perianal Fistula as well. The patient and POA do not want surgical intervention, accepted for hospice. Positive blood culture represent contaminant and he is non-toxic. Will defer to hospice care for management of  perirectal abscess and perianal fistula as per patient and POA wishes. One possible option might be oral doxycycline therapy. He is DNR.     PLAN  - Stop antibiotics, hold coumadin  - Continue wound care  - Will defer to palliative care for further management of perirectal abscess and fistula. Possible non-invasive options that align with patients wishes for non surgical management of perirectal abscess and fistula might include oral doxycyline and/or anorectal exam with draining seton placement into the fistula (when INR normalized - see Dr. Lee's recommendations)  - Diet per SLP  - Patient is medically stable for discharge to hospice.   - DNR

## 2017-03-24 NOTE — DISCHARGE PLANNING
Palliative Social Work    Per request of PC RN, Kasia, followed up with pts children, Dagoberto, ph#[phone], and Rodolfo, ph#[phone], about the plan for pt to discharge home with [facility].  Both Dagoberto and Rodolfo confirmed that they are in agreement with this plan and want to assure that pt is comfortable.  After speaking with Dagoberto and Rodolfo, contacted pts wife, Renetta, via phone and updated her that both children are in agreement with the plan.  Renetta was thankful for the follow up and said that she is also in regular communication with Dagoberto and Rodolfo and will keep them updated about the discharge date for pt.  Renetta said that she has been in contact with [facility] and the plan is for pt to discharge home after the weekend.  Encouraged Renetta to contact the PC team if she has any further questions/concerns.  S5 DONNY, Shannan, updated.

## 2017-03-25 LAB
ANION GAP SERPL CALC-SCNC: 5 MMOL/L (ref 0–11.9)
BACTERIA BLD CULT: ABNORMAL
BACTERIA BLD CULT: ABNORMAL
BUN SERPL-MCNC: 14 MG/DL (ref 8–22)
CALCIUM SERPL-MCNC: 8.7 MG/DL (ref 8.5–10.5)
CHLORIDE SERPL-SCNC: 116 MMOL/L (ref 96–112)
CO2 SERPL-SCNC: 24 MMOL/L (ref 20–33)
CREAT SERPL-MCNC: 0.88 MG/DL (ref 0.5–1.4)
GFR SERPL CREATININE-BSD FRML MDRD: >60 ML/MIN/1.73 M 2
GLUCOSE SERPL-MCNC: 101 MG/DL (ref 65–99)
INR PPP: 4.76 (ref 0.87–1.13)
POTASSIUM SERPL-SCNC: 3.6 MMOL/L (ref 3.6–5.5)
PROTHROMBIN TIME: 46.1 SEC (ref 12–14.6)
SIGNIFICANT IND 70042: ABNORMAL
SITE SITE: ABNORMAL
SODIUM SERPL-SCNC: 145 MMOL/L (ref 135–145)
SOURCE SOURCE: ABNORMAL
VANCOMYCIN SERPL-MCNC: 13.4 UG/ML

## 2017-03-25 PROCEDURE — 80202 ASSAY OF VANCOMYCIN: CPT

## 2017-03-25 PROCEDURE — A9270 NON-COVERED ITEM OR SERVICE: HCPCS | Performed by: STUDENT IN AN ORGANIZED HEALTH CARE EDUCATION/TRAINING PROGRAM

## 2017-03-25 PROCEDURE — 700105 HCHG RX REV CODE 258: Performed by: STUDENT IN AN ORGANIZED HEALTH CARE EDUCATION/TRAINING PROGRAM

## 2017-03-25 PROCEDURE — 700102 HCHG RX REV CODE 250 W/ 637 OVERRIDE(OP): Performed by: STUDENT IN AN ORGANIZED HEALTH CARE EDUCATION/TRAINING PROGRAM

## 2017-03-25 PROCEDURE — 85610 PROTHROMBIN TIME: CPT

## 2017-03-25 PROCEDURE — 770006 HCHG ROOM/CARE - MED/SURG/GYN SEMI*

## 2017-03-25 PROCEDURE — 80048 BASIC METABOLIC PNL TOTAL CA: CPT

## 2017-03-25 PROCEDURE — 700111 HCHG RX REV CODE 636 W/ 250 OVERRIDE (IP)

## 2017-03-25 PROCEDURE — 700102 HCHG RX REV CODE 250 W/ 637 OVERRIDE(OP): Performed by: INTERNAL MEDICINE

## 2017-03-25 PROCEDURE — A9270 NON-COVERED ITEM OR SERVICE: HCPCS | Performed by: INTERNAL MEDICINE

## 2017-03-25 PROCEDURE — 36415 COLL VENOUS BLD VENIPUNCTURE: CPT

## 2017-03-25 PROCEDURE — 700105 HCHG RX REV CODE 258

## 2017-03-25 PROCEDURE — 99232 SBSQ HOSP IP/OBS MODERATE 35: CPT | Mod: GC | Performed by: INTERNAL MEDICINE

## 2017-03-25 RX ORDER — QUETIAPINE FUMARATE 25 MG/1
12.5 TABLET, FILM COATED ORAL
Status: COMPLETED | OUTPATIENT
Start: 2017-03-25 | End: 2017-03-25

## 2017-03-25 RX ADMIN — STANDARDIZED SENNA CONCENTRATE AND DOCUSATE SODIUM 2 TABLET: 8.6; 5 TABLET, FILM COATED ORAL at 09:05

## 2017-03-25 RX ADMIN — POTASSIUM CHLORIDE 20 MEQ: 1.5 POWDER, FOR SOLUTION ORAL at 09:05

## 2017-03-25 RX ADMIN — CHOLECALCIFEROL TAB 10 MCG (400 UNIT) 400 UNITS: 10 TAB at 09:08

## 2017-03-25 RX ADMIN — SERTRALINE 37.5 MG: 50 TABLET, FILM COATED ORAL at 09:04

## 2017-03-25 RX ADMIN — CALCITRIOL 0.25 MCG: 0.25 CAPSULE, LIQUID FILLED ORAL at 09:05

## 2017-03-25 RX ADMIN — AMLODIPINE BESYLATE 2.5 MG: 5 TABLET ORAL at 09:04

## 2017-03-25 RX ADMIN — SODIUM CHLORIDE 1000 ML: 9 INJECTION, SOLUTION INTRAVENOUS at 07:37

## 2017-03-25 RX ADMIN — QUETIAPINE FUMARATE 12.5 MG: 25 TABLET, FILM COATED ORAL at 22:21

## 2017-03-25 RX ADMIN — STANDARDIZED SENNA CONCENTRATE AND DOCUSATE SODIUM 2 TABLET: 8.6; 5 TABLET, FILM COATED ORAL at 20:41

## 2017-03-25 RX ADMIN — POTASSIUM CHLORIDE 20 MEQ: 1.5 POWDER, FOR SOLUTION ORAL at 20:41

## 2017-03-25 RX ADMIN — VANCOMYCIN HYDROCHLORIDE 1000 MG: 100 INJECTION, POWDER, LYOPHILIZED, FOR SOLUTION INTRAVENOUS at 20:45

## 2017-03-25 RX ADMIN — CALCIUM CARBONATE-CHOLECALCIFEROL TAB 250 MG-125 UNIT 2 TABLET: 250-125 TAB at 09:05

## 2017-03-25 RX ADMIN — SODIUM CHLORIDE 1000 ML: 9 INJECTION, SOLUTION INTRAVENOUS at 17:57

## 2017-03-25 RX ADMIN — FINASTERIDE 5 MG: 5 TABLET, FILM COATED ORAL at 09:06

## 2017-03-25 ASSESSMENT — ENCOUNTER SYMPTOMS
COUGH: 0
BRUISES/BLEEDS EASILY: 0
DEPRESSION: 0
CHILLS: 0
MYALGIAS: 0
DIZZINESS: 0
HEADACHES: 0
NAUSEA: 0
BLURRED VISION: 0
FEVER: 0
PALPITATIONS: 0

## 2017-03-25 ASSESSMENT — PATIENT HEALTH QUESTIONNAIRE - PHQ9
SUM OF ALL RESPONSES TO PHQ9 QUESTIONS 1 AND 2: 0
SUM OF ALL RESPONSES TO PHQ QUESTIONS 1-9: 0
2. FEELING DOWN, DEPRESSED, IRRITABLE, OR HOPELESS: NOT AT ALL
1. LITTLE INTEREST OR PLEASURE IN DOING THINGS: NOT AT ALL

## 2017-03-25 ASSESSMENT — PAIN SCALES - GENERAL
PAINLEVEL_OUTOF10: 0
PAINLEVEL_OUTOF10: 0
PAINLEVEL_OUTOF10: 2
PAINLEVEL_OUTOF10: 2

## 2017-03-25 NOTE — PROGRESS NOTES
Coag negative staph on second culture. Suspect he is in fact bacteremic. Per discussions with family and patient we will start antibiotics. IV vancomycin is drug of choice for empiric treatment of coag negative staph. Will follow up sensitivities and monitor renal function .     He is clinically stable without fever or signs of sepsis. Night float updated regarding patient condition and provided contact information for POA and family.

## 2017-03-25 NOTE — PROGRESS NOTES
Pharmacy Kinetics 89 y.o. male on vancomycin day # 4 3/25/2017    Currently on Vancomycin 1000 mg IV once at 2230 on 3/24           - VR 3/24 @ 1917 = 14           900 mg IV once at 2232 on 3/23           -VR 3/23 @ 1544 =16.2                                                    2300 mg IV LD at 1624 on 3/22     Indication for Treatment: CoNS bacteremia    Pertinent history per medical record: Admitted on 3/22/2017 for perianal fistula. Failed doxycycline as outpatient (admitted on day  of antibiotics).    Other antibiotics: none    Allergies: Review of patient's allergies indicates no known allergies.     List concerns for renal function: age, low albumin    Pertinent cultures to date:   3/22 PBC X2: CoNS (sensis pending)  3/22 perianal wound: NGTD, rare GPCs on Gram stain  3/22 urine: negative    Recent Labs      17   1513  17   0013  17   0228   WBC  2.5*  5.5  3.3*   NEUTSPOLYS  62.50  77.20*  62.40     Recent Labs      17   1513  17   0013  17   0228   BUN  27*  27*  24*   CREATININE  1.08  1.22  1.21   ALBUMIN  3.2  3.1*  3.0*     Recent Labs      17   1544  17   1917   [facility]  [facility]   --    VANCORANDOM   --   14.0     Intake/Output Summary (Last 24 hours) at 17 1153  Last data filed at 17 0800   Gross per 24 hour   Intake   1462 ml   Output   1200 ml   Net    262 ml      Blood pressure 142/78, pulse 92, temperature 36.1 °C (97 °F), resp. rate 18, height 1.829 m (6'), weight 90.1 kg (198 lb 10.2 oz), SpO2 98 %. Temp (24hrs), Av.3 °C (97.3 °F), Min:35.9 °C (96.7 °F), Max:36.7 °C (98 °F)      A/P   1. Vancomycin dose change: initiate maintenance dosing of 1,000 mg Q24H  2. Next vancomycin level: 3/25 @  (ordered)  3. Goal trough: 16-20 mcg/mL  4. Comments: Patient previously receiving intermittent dosing. Last level was subtherapeutic and subsequently higher dose given. Will initiate maintenance dosing q24h. Check trough prior to next dose  to evaluate antibiotic clearance. Continue to monitor renal function and follow up culture results. Pharmacy will continue to follow.    Socorro CoyleD

## 2017-03-25 NOTE — PROGRESS NOTES
Patient resting comfortably in bed at this time. All am shift medications administered to patient as ordered. Patient tolerates medications, meals, and daily care without difficulty. All needs met at this time. Will continue to monitor

## 2017-03-25 NOTE — PROGRESS NOTES
[facility] [place] [facility] [place]    Name Jimmy Perez     1925   Age/Sex 89 y.o. male   MRN [id number]   Code Status DNR     After 5PM or if no immediate response to page, please call for cross-coverage  Attending/Team: Srinath Pacheco Call [phone] to page   1st Call - Day Intern (R1):   Mckay Christie 2nd Call - Day Sr. 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 Hold coumadin.     Review of Systems   Constitutional: Negative for fever and chills.   Eyes: Negative for blurred vision.   Respiratory: Negative for cough.    Cardiovascular: Negative for chest pain and palpitations.   Gastrointestinal: Negative for nausea.   Genitourinary: Negative for dysuria.   Musculoskeletal: Negative for myalgias.   Skin: Negative for rash.   Neurological: Negative for dizziness and headaches.   Endo/Heme/Allergies: Does not bruise/bleed easily.   Psychiatric/Behavioral: Negative for depression.     Consultants/Specialty  Palliative  Surgery  SW    Disposition  Medically stable for discharge to hospice care. [facility] stated that the agency has accepted the patient. He does not require IV antibiotics.     Quality Measures  EKG reviewed, Labs reviewed, Medications reviewed and Radiology images reviewed  Palma catheter: No Palma      DVT Prophylaxis: Warfarin (Coumadin)            Physical Exam       Filed Vitals:    17 1427 17 2040 17 0510 17 0641   BP: 132/72 152/92 159/95 142/78   Pulse: 89 71 63 92   Temp: 36.7 °C (98 °F) 35.9 °C (96.7 °F) 36.3 °C (97.4 °F) 36.1 °C (97 °F)   Resp: 18 19 19 18   Height:       Weight:   90.1 kg (198 lb 10.2 oz)    SpO2: 98% 96% 99% 98%     Body  mass index is 26.93 kg/(m^2).  Oxygen Therapy:  Pulse Oximetry: 98 %, O2 (LPM): 1, O2 Delivery: Silicone Nasal Cannula    Physical Exam   Constitutional: He is oriented to person, place, and time and well-developed, well-nourished, and in no distress. No distress.   HENT:   Head: Normocephalic and atraumatic.   Eyes: Pupils are equal, round, and reactive to light. No scleral icterus.   Neck: No JVD present.   Cardiovascular: Normal rate and regular rhythm.    No murmur heard.  Pulmonary/Chest: Effort normal. No respiratory distress. He has no wheezes. He has no rales.   Abdominal: He exhibits no distension. There is no tenderness.   Anal fistula noted, no fluctuant mass, minimal redness and irritation around anus. No dark or bright red stool   Musculoskeletal: He exhibits no edema or tenderness.   Neurological: He is alert and oriented to person, place, and time.   Skin: Skin is dry. He is not diaphoretic.   Nursing note and vitals reviewed.    Lab Data Review:  Recent Results (from the past 24 hour(s))   VANCOMYCIN TIMED (RANDOM) LEVEL    Collection Time: 03/24/17  7:17 PM   Result Value Ref Range    Vancomycin Unknown Level 14.0 ug/mL   PROTHROMBIN TIME    Collection Time: 03/25/17  1:54 AM   Result Value Ref Range    PT 46.1 (H) 12.0 - 14.6 sec    INR 4.76 (H) 0.87 - 1.13     PROBLEM LIST  Active Hospital Problems    Diagnosis   • *Anal fistula [K60.3]   • Bacteremia due to Gram-positive bacteria [A49.9]   • Delirium superimposed on dementia [F05]   • Supratherapeutic INR [R79.1]     ASSESSMENT:    89 year old male with perirectal abscess and perianal fistuala. Pending discharge to hospice. Blood cultures do suggest coag negative staph bacteremia. Will treat with IV antibiotics per patient and POA wishes. Pending sensitivities we can start an oral agent that he can be discharged on. Suppressive therapy with oral doxycyline is a reasonable option.    PLAN  - Appreciate palliative   - IV vancomycin, monitor renal  function and INR.  - Continue wound care  - Will defer to palliative care for further management of perirectal abscess and fistula. Possible non-invasive options that align with patients wishes for non surgical management of perirectal abscess and fistula might include oral doxycyline and/or anorectal exam with draining seton placement into the fistula (when INR normalized - see Dr. Lee's recommendations)  - Diet per SLP  - Patient is medically stable for discharge to hospice.   - DNR

## 2017-03-25 NOTE — PROGRESS NOTES
Pharmacy Kinetics 89 y.o. male on vancomycin day # 3 3/24/2017    Currently on Vancomycin 900 mg iv once at 2232 on 3/23           2300 mg IV LD at 1624 on 3/22    Indication for Treatment: Staph bacteremia    Pertinent history per medical record: Admitted on 3/22/2017 for perianal fistula. Pt failed out abx for SSTI.    Other antibiotics: none at this time    Allergies: Review of patient's allergies indicates no known allergies.     List concerns for renal function: age, CT with contrast 3/22, AST at 10 units/L, albumin at 3.0 g/dl    Pertinent cultures to date:      3/22/2017 15:13 3/22/2017 16:00   Source BLD BLD   Site PERIPHERAL PERIPHERAL   Significant Indicator POS (POS) POS (POS)  Gram positive cocci       Recent Labs      17   1513  17   0013  17   0228   WBC  2.5*  5.5  3.3*   NEUTSPOLYS  62.50  77.20*  62.40     Recent Labs      17   1513  17   0013  17   0228   BUN  27*  27*  24*   CREATININE  1.08  1.22  1.21   ALBUMIN  3.2  3.1*  3.0*     Recent Labs      17   1544  17   1917   VANCOTROUGH  16.2   --    VANCORANDOM   --   14.0     Intake/Output Summary (Last 24 hours) at 17 2109  Last data filed at 17 0800   Gross per 24 hour   Intake     40 ml   Output      0 ml   Net     40 ml      Blood pressure 132/72, pulse 89, temperature 36.7 °C (98 °F), resp. rate 18, height 1.829 m (6'), weight 86.5 kg (190 lb 11.2 oz), SpO2 98 %. Temp (24hrs), Av.3 °C (97.4 °F), Min:36.1 °C (97 °F), Max:36.7 °C (98 °F)      A/P   1. Vancomycin dose change: 1000 mg iv once  2. Next vancomycin level: 22 hour VR at 2030  3. Goal trough: 16-20 mcg/ml  4. Comments: 21 hour VR subtherapeutic at 14.0. Will administer 1000 mg iv once and order 22 hour VR. Pharmacy will continue to monitor.     Cynthia Mendoza, SocorroD

## 2017-03-25 NOTE — CARE PLAN
Problem: Knowledge Deficit  Goal: Knowledge of disease process/condition, treatment plan, diagnostic tests, and medications will improve  Outcome: PROGRESSING AS EXPECTED  Intervention: Scheduled medications and rationale reviewed with patient. Patient Verbalizes understanding.

## 2017-03-25 NOTE — CARE PLAN
Problem: Discharge Barriers/Planning  Goal: Patient’s continuum of care needs will be met  Intervention: Involve patient and significant other/support system in setting and prioritizing goals for hospital stay and discharge  Possible discharge to home with hospice.      Problem: Skin Integrity  Goal: Risk for impaired skin integrity will decrease  Intervention: Assess risk factors for impaired skin integrity and/or pressure ulcers  q 2 turn and kept skin dry and clean.

## 2017-03-25 NOTE — PALLIATIVE CARE
Palliative Care follow-up  PC RN spoke with MD and [facility] RN CHRISTOPH regarding POC. Per discussions with the family and medical team, the patient will receive IV antibiotics this weekend and transition home with [facility] on a course of oral antibiotics to be completed under their care and advisement. Wife Renetta is in agreement with this plan. Per CHRISTOPH, equipment has been ordered and the plan is for the patient to return home Monday via [facility].    Updated:  team    Plan: Home Monday with [facility].    Thank you for allowing Palliative Care to participate in this patient's care. Please feel free to call [phone] with any questions or concerns.

## 2017-03-26 LAB
BACTERIA BLD CULT: ABNORMAL
BACTERIA BLD CULT: ABNORMAL
INR PPP: 3.66 (ref 0.87–1.13)
PROTHROMBIN TIME: 37.5 SEC (ref 12–14.6)
SIGNIFICANT IND 70042: ABNORMAL
SITE SITE: ABNORMAL
SOURCE SOURCE: ABNORMAL

## 2017-03-26 PROCEDURE — 770006 HCHG ROOM/CARE - MED/SURG/GYN SEMI*

## 2017-03-26 PROCEDURE — 700102 HCHG RX REV CODE 250 W/ 637 OVERRIDE(OP): Performed by: STUDENT IN AN ORGANIZED HEALTH CARE EDUCATION/TRAINING PROGRAM

## 2017-03-26 PROCEDURE — 85610 PROTHROMBIN TIME: CPT

## 2017-03-26 PROCEDURE — 36415 COLL VENOUS BLD VENIPUNCTURE: CPT

## 2017-03-26 PROCEDURE — A9270 NON-COVERED ITEM OR SERVICE: HCPCS | Performed by: INTERNAL MEDICINE

## 2017-03-26 PROCEDURE — 700105 HCHG RX REV CODE 258

## 2017-03-26 PROCEDURE — 99232 SBSQ HOSP IP/OBS MODERATE 35: CPT | Mod: GC | Performed by: INTERNAL MEDICINE

## 2017-03-26 PROCEDURE — A9270 NON-COVERED ITEM OR SERVICE: HCPCS | Performed by: STUDENT IN AN ORGANIZED HEALTH CARE EDUCATION/TRAINING PROGRAM

## 2017-03-26 PROCEDURE — 700111 HCHG RX REV CODE 636 W/ 250 OVERRIDE (IP)

## 2017-03-26 PROCEDURE — 700102 HCHG RX REV CODE 250 W/ 637 OVERRIDE(OP): Performed by: INTERNAL MEDICINE

## 2017-03-26 RX ADMIN — CALCIUM CARBONATE-CHOLECALCIFEROL TAB 250 MG-125 UNIT 2 TABLET: 250-125 TAB at 08:58

## 2017-03-26 RX ADMIN — FINASTERIDE 5 MG: 5 TABLET, FILM COATED ORAL at 08:59

## 2017-03-26 RX ADMIN — AMLODIPINE BESYLATE 2.5 MG: 5 TABLET ORAL at 08:59

## 2017-03-26 RX ADMIN — SERTRALINE 37.5 MG: 50 TABLET, FILM COATED ORAL at 08:59

## 2017-03-26 RX ADMIN — ACETAMINOPHEN 650 MG: 325 TABLET, FILM COATED ORAL at 21:09

## 2017-03-26 RX ADMIN — POTASSIUM CHLORIDE 20 MEQ: 1.5 POWDER, FOR SOLUTION ORAL at 08:59

## 2017-03-26 RX ADMIN — CALCITRIOL 0.25 MCG: 0.25 CAPSULE, LIQUID FILLED ORAL at 08:59

## 2017-03-26 RX ADMIN — VANCOMYCIN HYDROCHLORIDE 1200 MG: 100 INJECTION, POWDER, LYOPHILIZED, FOR SOLUTION INTRAVENOUS at 22:15

## 2017-03-26 RX ADMIN — POTASSIUM CHLORIDE 20 MEQ: 1.5 POWDER, FOR SOLUTION ORAL at 21:09

## 2017-03-26 RX ADMIN — STANDARDIZED SENNA CONCENTRATE AND DOCUSATE SODIUM 2 TABLET: 8.6; 5 TABLET, FILM COATED ORAL at 21:09

## 2017-03-26 RX ADMIN — CHOLECALCIFEROL TAB 10 MCG (400 UNIT) 400 UNITS: 10 TAB at 09:04

## 2017-03-26 ASSESSMENT — ENCOUNTER SYMPTOMS
BLURRED VISION: 0
COUGH: 0
PALPITATIONS: 0
NAUSEA: 0
CHILLS: 0
DIZZINESS: 0
DEPRESSION: 0
FEVER: 0
BRUISES/BLEEDS EASILY: 0
MYALGIAS: 0
HEADACHES: 0

## 2017-03-26 ASSESSMENT — PAIN SCALES - GENERAL: PAINLEVEL_OUTOF10: 3

## 2017-03-26 NOTE — PROGRESS NOTES
Pharmacy Kinetics 89 y.o. male on vancomycin day # 5 3/26/2017    Currently on Vancomycin 1000 mg iv q24hr               Indication for Treatment: CoNS bacteremia    Pertinent history per medical record: Admitted on 3/22/2017 for perianal fistula. Failed doxycycline as outpatient (admitted on day 7/14 of antibiotics). Two out of two blood cultures positive on 3/22 for Staph epidermidis, so vancomycin goal increased. Patient to discharge home with hospice on 3/25 per palliative care notes; IV antibiotics through weekend and then d/c home on po antibiotics.    Other antibiotics: none    Allergies: Review of patient's allergies indicates no known allergies.     List concerns for renal function: age, low albumin    Pertinent cultures to date:   3/22 PBC X2: Staph epidermidis ([facility])  3/22 perianal wound: NGTD, rare GPCs on Gram stain  3/22 urine: negative    Recent Labs      17   WBC  3.3*   NEUTSPOLYS  62.40     Recent Labs      17   0228  17   BUN  24*  14   CREATININE  1.21  0.88   ALBUMIN  3.0*   --      Recent Labs      17   1544  17   1917  17   VANCOTROUGH  16.2   --    --    VANCORANDOM   --   14.0  13.4     Intake/Output Summary (Last 24 hours) at 17 1146  Last data filed at 17 0900   Gross per 24 hour   Intake   2182 ml   Output   2675 ml   Net   -493 ml      Blood pressure 129/91, pulse 70, temperature 37.1 °C (98.7 °F), resp. rate 18, height 1.829 m (6'), weight 90.1 kg (198 lb 10.2 oz), SpO2 92 %. Temp (24hrs), Av.6 °C (97.8 °F), Min:36.1 °C (96.9 °F), Max:37.1 °C (98.7 °F)      A/P   1. Vancomycin dose change: increase to 1200 mg q24h  2. Next vancomycin level: ~3 days if stays inpatient  3. Goal trough: 16-20 mcg/mL  4. Comments: Trough level of 13.4 is subtherapeutic. This was drawn prior to 4th dose, so patient nearing steady state. Renal function continues to improve, so patient unlikely to accumulate to goal level. Will increase  tonight's dose to 1200 mg. Blood culture show Staph epidermidis, oxacillin resistant. Check trough in ~ 3 days if patient remains hospitalized. Patient to discharge home with hospice on 3/25 per palliative care notes; IV antibiotics through weekend and then d/c home on po antibiotics. Pharmacy will continue to follow.    Kapil Vanegas, SocorroD

## 2017-03-26 NOTE — PROGRESS NOTES
Patient exhibits verbal cues conveying confusion and disorientation to time, place, and situation. Frequent and occasionally effective attempts by RN staff to orient patient. Patient does not attempt to ambulate from bed or impose physical self harm. Provider informed. Potential medications to ease confusion discussed with provider at patient bedside. Provider states that he will add orders to patient care plan as necessary. Will continue to monitor

## 2017-03-26 NOTE — PROGRESS NOTES
[facility] [place] [facility] Interval [place]    Name Jimmy Perez     1925   Age/Sex 89 y.o. male   MRN [id number]   Code Status DNR     After 5PM or if no immediate response to page, please call for cross-coverage  Attending/Team: Srinath Pacheco Call [phone] to page   1st Call - Day Intern (R1):   Mckay Christie 2nd Call - Day Sr. 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 Hold coumadin.   Staph epi bacteremia - Vanco, transition to doxy tomorrow.    Review of Systems   Constitutional: Negative for fever and chills.   Eyes: Negative for blurred vision.   Respiratory: Negative for cough.    Cardiovascular: Negative for chest pain and palpitations.   Gastrointestinal: Negative for nausea.   Genitourinary: Negative for dysuria.   Musculoskeletal: Negative for myalgias.   Skin: Negative for rash.   Neurological: Negative for dizziness and headaches.   Endo/Heme/Allergies: Does not bruise/bleed easily.   Psychiatric/Behavioral: Negative for depression.     Consultants/Specialty  Palliative  Surgery  SW    Disposition  Medically stable for discharge to hospice care. [facility] stated that the agency has accepted the patient. Stop IV antibiotics tomorrow. Oral doxy as palliative measure.     Quality Measures  EKG reviewed, Labs reviewed, Medications reviewed and Radiology images reviewed  Palma catheter: No Palma      DVT Prophylaxis: Warfarin (Coumadin)            Physical Exam       Filed Vitals:    17 1409 17 0440 17 0800   BP: 152/85 131/93 150/91 129/91   Pulse: 76 70 70 70   Temp: 36.6 °C (97.8 °F) 36.4 °C (97.6 °F) 36.1 °C (96.9 °F) 37.1 °C (98.7 °F)   Resp: 18 17 18  18   Height:       Weight:       SpO2: 95% 93% 94% 92%     Body mass index is 26.93 kg/(m^2).  Oxygen Therapy:  Pulse Oximetry: 92 %, O2 (LPM): 1, O2 Delivery: Nasal Cannula    Physical Exam   Constitutional: He is oriented to person, place, and time and well-developed, well-nourished, and in no distress. No distress.   HENT:   Head: Normocephalic and atraumatic.   Eyes: Pupils are equal, round, and reactive to light. No scleral icterus.   Neck: No JVD present.   Cardiovascular: Normal rate and regular rhythm.    No murmur heard.  Pulmonary/Chest: Effort normal. No respiratory distress. He has no wheezes. He has no rales.   Abdominal: He exhibits no distension. There is no tenderness.   Anal fistula noted, no fluctuant mass, minimal redness and irritation around anus. No dark or bright red stool   Musculoskeletal: He exhibits no edema or tenderness.   Neurological: He is alert and oriented to person, place, and time.   Skin: Skin is dry. He is not diaphoretic.   Nursing note and vitals reviewed.    Lab Data Review:  Recent Results (from the past 24 hour(s))   VANCOMYCIN TIMED (RANDOM) LEVEL    Collection Time: 03/25/17  8:16 PM   Result Value Ref Range    Vancomycin Unknown Level 13.4 ug/mL   BASIC METABOLIC PANEL    Collection Time: 03/25/17  8:16 PM   Result Value Ref Range    Sodium 145 135 - 145 mmol/L    Potassium 3.6 3.6 - 5.5 mmol/L    Chloride 116 (H) 96 - 112 mmol/L    Co2 24 20 - 33 mmol/L    Glucose 101 (H) 65 - 99 mg/dL    Bun 14 8 - 22 mg/dL    Creatinine 0.88 0.50 - 1.40 mg/dL    Calcium 8.7 8.5 - 10.5 mg/dL    Anion Gap 5.0 0.0 - 11.9   ESTIMATED GFR    Collection Time: 03/25/17  8:16 PM   Result Value Ref Range    GFR If African American >60 >60 mL/min/1.73 m 2    GFR If Non African American >60 >60 mL/min/1.73 m 2   PROTHROMBIN TIME    Collection Time: 03/26/17 12:45 AM   Result Value Ref Range    PT 37.5 (H) 12.0 - 14.6 sec    INR 3.66 (H) 0.87 - 1.13     PROBLEM LIST  Active Hospital Problems     Diagnosis   • *Anal fistula [K60.3]   • Bacteremia due to Gram-positive bacteria [A49.9]   • Delirium superimposed on dementia [F05]   • Supratherapeutic INR [R79.1]     ASSESSMENT:    89 year old male with perirectal abscess and perianal fistuala. Pending discharge to hospice. Staph bacteremia, non-toxic. Can start PO doxy tomorrow for discharge. Does have bruising and scabbing on arms. No fluctuant mass, nothing to drain as palliative measure.     PLAN  - Appreciate palliative   - IV vancomycin, start PO doxy tomorrow   - Continue wound care  - Will defer to palliative care for further management of perirectal abscess and fistula. Possible non-invasive options that align with patients wishes for non surgical management of perirectal abscess and fistula might include oral doxycyline and/or anorectal exam with draining seton placement into the fistula (when INR normalized - see Dr. Lee's recommendations)  - Diet per SLP  - Patient is medically stable for discharge to hospice.   - DNR

## 2017-03-26 NOTE — PROGRESS NOTES
Pharmacy Kinetics Addendum:    89 y.o. male on vancomycin day # 4 3/25/2017    Currently on Vancomycin 1000 mg IV once at 2230 on 3/24                                            - VR 3/24 @ 1917 = 14                                             900 mg IV once at 2232 on 3/23                                            -VR 3/23 @ 1544 =16.2                                                                                       2300 mg IV LD at 1624 on 3/22    Indication for Treatment: CoNS bacteremia    Recent Labs      03/23/17   0013  03/24/17   0228  03/25/17   2016   BUN  27*  24*  14   CREATININE  1.22  1.21  0.88   ALBUMIN  3.1*  3.0*   --      Recent Labs      03/23/17   1544  03/24/17 1917  03/25/17   2016   VANCOTROUGH  16.2   --    --    VANCORANDOM   --   14.0  13.4       A/P   1. Vancomycin dose change: continue current maintenance dosing  2. Next vancomycin level: not ordered   3. Goal trough: 12-16 mcg/ml  4. Comments: Level therapeutic at 13.4. No changes indicated. Pharmacy will continue to monitor.    Cynthia Mendoza, SocorroD

## 2017-03-27 VITALS
HEIGHT: 72 IN | RESPIRATION RATE: 18 BRPM | DIASTOLIC BLOOD PRESSURE: 89 MMHG | BODY MASS INDEX: 26.9 KG/M2 | HEART RATE: 71 BPM | WEIGHT: 198.63 LBS | OXYGEN SATURATION: 98 % | TEMPERATURE: 97.2 F | SYSTOLIC BLOOD PRESSURE: 157 MMHG

## 2017-03-27 PROCEDURE — 700102 HCHG RX REV CODE 250 W/ 637 OVERRIDE(OP): Performed by: STUDENT IN AN ORGANIZED HEALTH CARE EDUCATION/TRAINING PROGRAM

## 2017-03-27 PROCEDURE — 99239 HOSP IP/OBS DSCHRG MGMT >30: CPT | Mod: GC | Performed by: INTERNAL MEDICINE

## 2017-03-27 PROCEDURE — 302255 BARRIER CREAM MOISTURE BAZA PROTECT: Performed by: INTERNAL MEDICINE

## 2017-03-27 PROCEDURE — 700102 HCHG RX REV CODE 250 W/ 637 OVERRIDE(OP): Performed by: INTERNAL MEDICINE

## 2017-03-27 PROCEDURE — A9270 NON-COVERED ITEM OR SERVICE: HCPCS | Performed by: INTERNAL MEDICINE

## 2017-03-27 PROCEDURE — A9270 NON-COVERED ITEM OR SERVICE: HCPCS | Performed by: STUDENT IN AN ORGANIZED HEALTH CARE EDUCATION/TRAINING PROGRAM

## 2017-03-27 RX ORDER — DOXYCYCLINE 100 MG/1
100 TABLET ORAL EVERY 12 HOURS
Status: DISCONTINUED | OUTPATIENT
Start: 2017-03-27 | End: 2017-03-27

## 2017-03-27 RX ORDER — AMOXICILLIN AND CLAVULANATE POTASSIUM 875; 125 MG/1; MG/1
1 TABLET, FILM COATED ORAL 2 TIMES DAILY
Qty: 10 TAB | Refills: 0 | Status: SHIPPED | DISCHARGE
Start: 2017-03-27 | End: 2017-04-01

## 2017-03-27 RX ORDER — AMOXICILLIN AND CLAVULANATE POTASSIUM 875; 125 MG/1; MG/1
1 TABLET, FILM COATED ORAL EVERY 12 HOURS
Status: DISCONTINUED | OUTPATIENT
Start: 2017-03-27 | End: 2017-03-27 | Stop reason: HOSPADM

## 2017-03-27 RX ADMIN — CALCIUM CARBONATE-CHOLECALCIFEROL TAB 250 MG-125 UNIT 2 TABLET: 250-125 TAB at 09:07

## 2017-03-27 RX ADMIN — CALCITRIOL 0.25 MCG: 0.25 CAPSULE, LIQUID FILLED ORAL at 09:07

## 2017-03-27 RX ADMIN — SERTRALINE 37.5 MG: 50 TABLET, FILM COATED ORAL at 09:06

## 2017-03-27 RX ADMIN — FINASTERIDE 5 MG: 5 TABLET, FILM COATED ORAL at 09:07

## 2017-03-27 RX ADMIN — AMOXICILLIN AND CLAVULANATE POTASSIUM 1 TABLET: 875; 125 TABLET, FILM COATED ORAL at 14:07

## 2017-03-27 RX ADMIN — AMLODIPINE BESYLATE 2.5 MG: 5 TABLET ORAL at 09:07

## 2017-03-27 RX ADMIN — POTASSIUM CHLORIDE 20 MEQ: 1.5 POWDER, FOR SOLUTION ORAL at 09:06

## 2017-03-27 RX ADMIN — STANDARDIZED SENNA CONCENTRATE AND DOCUSATE SODIUM 2 TABLET: 8.6; 5 TABLET, FILM COATED ORAL at 09:06

## 2017-03-27 RX ADMIN — CHOLECALCIFEROL TAB 10 MCG (400 UNIT) 400 UNITS: 10 TAB at 09:06

## 2017-03-27 ASSESSMENT — PAIN SCALES - GENERAL: PAINLEVEL_OUTOF10: 3

## 2017-03-27 ASSESSMENT — PATIENT HEALTH QUESTIONNAIRE - PHQ9
2. FEELING DOWN, DEPRESSED, IRRITABLE, OR HOPELESS: NOT AT ALL
SUM OF ALL RESPONSES TO PHQ QUESTIONS 1-9: 0
1. LITTLE INTEREST OR PLEASURE IN DOING THINGS: NOT AT ALL
SUM OF ALL RESPONSES TO PHQ9 QUESTIONS 1 AND 2: 0

## 2017-03-27 NOTE — DISCHARGE PLANNING
Dr. Wiley stated that the pt is medically cleared for dc.  DONNY LM for AJ with [facility] regarding pt's medical equipment.  DONNY will f/u.

## 2017-03-27 NOTE — DISCHARGE PLANNING
Per ISABELA Ferrera, pt will leave [facility] at 1400.  DONNY updated pt's wife, AJ with [facility] and RN Janice.

## 2017-03-27 NOTE — DISCHARGE SUMMARY
[facility] [place] [facility] Medicine Discharge Summary      Admit Date:  3/22/2017       Discharge Date:   3/27/17    Service:   [facility] Internal Medicine Gray Team  Attending Physician(s):   Ayesha       Senior Resident(s):   Bret MOORE  Alvaro Resident(s):   Mckay Christie      Primary Diagnosis:   Anal Fistula  Secondary Diagnoses:                Active Hospital Problems    Diagnosis   • *Anal fistula [K60.3]   • Perianal abscess [K61.0]   • Delirium superimposed on dementia [F05]   • Supratherapeutic INR [R79.1]       Hospital Summary (Brief Narrative):       Mr Perez is a 89 year old male admitted for perianal abscess and anal fistual. On physical exam he was found to have an open abscess cavity anterior to the anal verge with some drainage and stool with 1cm fistula. He was hemodynamically stable on admission without signs of sepsis or systemic toxicity. CT abdomen and pelvis demonstrated perianal fistuala without evidence of abscess. CT also noted presence of hypodense left renal mass, possible hemorrhagic cyst. Findings were discussed with patient and family, including his POA.      He does have advanced dementia at baseline. A consult was placed to our palliative care and hospice team. In particular we discussed findings on CT including the left renal mass. We discussed possible options to evaluate the mass including repeat CT and MRI. We also discussed surgical options for management of perianal abscess and anal fistula.  A family meeting involving palliative care, the patient, medical team, and POA was set up early in admission with decision to discharge the patient on hospice care. The patient and family stated they do not want to pursue further workup of the perianal abscess, anal fistula or renal mass. They stated the goals of this patient were to be at home and wanted the focus of care to be on symptom control. In particular, family and patient stated surgical intervention was not wanted now or in the future. They  stated that IV antibiotics were okay but that they did not want IV treatment to delay discharge home to hospice. The patient, the patient's wife and the patient's son (son is POA) were included in the conversation and questions were answered.     Surgical consult was placed for his perianal abscess and anal fistula. They recommended conservative management while his INR was elevated secondary to antibiotic use. The patient and family did state that any surgical intervention would not be in agreement with patient wishes. They did state that antibiotics are okay and preferred the patient be on an oral antibiotic so that he could be discharged to hospice care in the comfort of his home.     The patient did grow coag negative staph on two blood cultures. He did not appear toxic on physical exam and remained hemodynamically stable. He was treated with IV vancomycin which was later transitioned to oral augmentin based on sensitivity findings. Case was discussed with palliative/hospice care, patient and family and it was decided that suppressive therapy with doxycyline was in agreement with patient and family wishes and he should not continue to have IV antibiotics. Wound care was ordered on discharge for home health.    He was discharged in stable condition to hospice care.       Patient /Hospital Summary (Details -- Problem Oriented) :          Active Hospital Problems    Diagnosis   • Perianal abscess [K61.0]   • Delirium superimposed on dementia [F05]   • Supratherapeutic INR [R79.1]   • Anal fistula [K60.3]     PLAN  - Augmentin daily. Duration per hospice team and patient wishes.  - Continue wound care with home health and hospice      Consultants:     Surgery  Palliative/Hospice    Procedures:        None    Imaging/ Testing:      CT-ABDOMEN-PELVIS WITH   Final Result      1.  Perianal fistula. Details of sphincter involvement cannot be determined on CT. No abscess is appreciated.      2.  Hiatal hernia.      3.   Diverticulosis.      4.  2.5 cm indeterminate hypodense left renal mass, possibly a hemorrhagic cyst. If clinically indicated, nonemergent renal protocol CT or MRI could be performed for further evaluation.      5.  Atherosclerosis.      6.  Small bilateral pleural effusions with adjacent atelectasis. Peribronchial thickening in the left lung base is consistent with inflammation/bronchitis.      7.  Cardiomegaly      DX-CHEST-PORTABLE (1 VIEW)   Final Result      1.  Mild basilar atelectasis or scar      2.  Enlarged cardiac silhouette        Discharge Medications:         Medication Reconciliation: Completed     Medication List      START taking these medications       Instructions    amoxicillin-clavulanate 875-125 MG Tabs   Last time this was given:  1 Tab on 3/27/2017  2:07 PM   Commonly known as:  AUGMENTIN    Take 1 Tab by mouth 2 times a day for 5 days.   Dose:  1 Tab         CONTINUE taking these medications       Instructions    acetaminophen 500 MG Tabs   Last time this was given:  650 mg on 3/26/2017  9:09 PM   Commonly known as:  TYLENOL    Take 500-1,000 mg by mouth See Admin Instructions. 1000 mg @ 0800 500 mg @ 1200 1000 mg @@ 2000   Dose:  500-1000 mg       alfuzosin 10 MG SR tablet   Commonly known as:  UROXATRAL    Take 10 mg by mouth every day.   Dose:  10 mg       amlodipine 2.5 MG Tabs   Last time this was given:  2.5 mg on 3/27/2017  9:07 AM   Commonly known as:  NORVASC    Take 2.5 mg by mouth every day.   Dose:  2.5 mg       calcitRIOL 0.25 MCG Caps   Last time this was given:  0.25 mcg on 3/27/2017  9:07 AM   Commonly known as:  ROCALTROL    Take 0.25 mcg by mouth every day.   Dose:  0.25 mcg       CALCIUM 500+D 500-200 MG-UNIT Tabs   Generic drug:  Calcium Carb-Cholecalciferol    Take 1 Tab by mouth every morning with breakfast.   Dose:  1 Tab       docusate calcium 240 MG Caps   Commonly known as:  SURFAK    Take 240 mg by mouth every day.   Dose:  240 mg       finasteride 5 MG Tabs   Last  time this was given:  5 mg on 3/27/2017  9:07 AM   Commonly known as:  PROSCAR    Take 5 mg by mouth every day.   Dose:  5 mg       furosemide 20 MG Tabs   Commonly known as:  LASIX    Take 10 mg by mouth every 48 hours.   Dose:  10 mg       * gabapentin 100 MG Caps   Commonly known as:  NEURONTIN    Take 100 mg by mouth every bedtime.   Dose:  100 mg       * gabapentin 100 MG Caps   Commonly known as:  NEURONTIN    Take 100 mg by mouth 2 times a day as needed. Indications: Diabetes with Nerve Disease   Dose:  100 mg       potassium chloride SA 20 MEQ Tbcr   Last time this was given:  20 mEq on 3/23/2017 12:25 PM   Commonly known as:  Kdur    Take 10 mEq by mouth every 48 hours.   Dose:  10 mEq       sertraline 25 MG tablet   Last time this was given:  37.5 mg on 3/27/2017  9:06 AM   Commonly known as:  ZOLOFT    Take 37.5 mg by mouth every day.   Dose:  37.5 mg       warfarin 2 MG Tabs   Commonly known as:  COUMADIN    Take 3 mg by mouth every evening.   Dose:  3 mg       * Notice:  This list has 2 medication(s) that are the same as other medications prescribed for you. Read the directions carefully, and ask your doctor or other care provider to review them with you.      STOP taking these medications          doxycycline monohydrate 100 MG tablet   Commonly known as:  ADOXA               Disposition:   Home hospice    Diet:   Regular diet    Activity:   As tolerated    Instructions:      Home hospice  Augmentin for 5 days  Home health with wound care     The patient was instructed to return to the ER in the event of worsening symptoms. I have counseled the patient on the importance of compliance and the patient has agreed to proceed with all medical recommendations and follow up plan indicated above.   The patient understands that all medications come with benefits and risks. Risks may include permanent injury or death and these risks can be minimized with close reassessment and monitoring.        Primary Care  Provider:    Darcy Gibbons  Discharge summary faxed to primary care provider:  Deferred  Copy of discharge summary given to the patient: Deferred    Follow up appointment details :      None    Pending Studies:        None    Time spent on discharge day patient visit, preparing discharge paperwork and arranging for patient follow up.    Discharge Time (Minutes) :    > 60 minutes      Condition on Discharge    ______________________________________________________________________    Interval history/exam for day of discharge:     No acute events. Pleasantly confused.     Filed Vitals:    03/26/17 0800 03/26/17 1600 03/26/17 1856 03/27/17 0305   BP: 129/91 153/96 136/89 144/94   Pulse: 70 70 71 68   Temp: 37.1 °C (98.7 °F) 37 °C (98.6 °F) 36.3 °C (97.3 °F) 36.1 °C (97 °F)   Resp: 18 18 18 18   Height:       Weight:       SpO2: 92% 94% 98% 98%     Weight/BMI: Body mass index is 26.93 kg/(m^2).  Pulse Oximetry: 98 %, O2 (LPM): 1, O2 Delivery: Nasal Cannula    General: NAD  CVS: RRR no M/G/R  PULM: CTAB  Perirectal fistula, stool. No blood.     Most Recent Labs:    Lab Results   Component Value Date/Time    WBC 3.3* 03/24/2017 02:28 AM    RBC 3.55* 03/24/2017 02:28 AM    HEMOGLOBIN 9.9* 03/24/2017 02:28 AM    HEMATOCRIT 31.5* 03/24/2017 02:28 AM    MCV 88.7 03/24/2017 02:28 AM    MCH 27.9 03/24/2017 02:28 AM    MCHC 31.4* 03/24/2017 02:28 AM    MPV 10.1 03/24/2017 02:28 AM    NEUTROPHILS-POLYS 62.40 03/24/2017 02:28 AM    LYMPHOCYTES 19.40* 03/24/2017 02:28 AM    MONOCYTES 15.50* 03/24/2017 02:28 AM    EOSINOPHILS 0.90 03/24/2017 02:28 AM    BASOPHILS 0.60 03/24/2017 02:28 AM      Lab Results   Component Value Date/Time    SODIUM 145 03/25/2017 08:16 PM    POTASSIUM 3.6 03/25/2017 08:16 PM    CHLORIDE 116* 03/25/2017 08:16 PM    CO2 24 03/25/2017 08:16 PM    GLUCOSE 101* 03/25/2017 08:16 PM    BUN 14 03/25/2017 08:16 PM    CREATININE 0.88 03/25/2017 08:16 PM      Lab Results   Component Value Date/Time    ALT(SGPT) 6  03/24/2017 02:28 AM    AST(SGOT) 10* 03/24/2017 02:28 AM    ALKALINE PHOSPHATASE 53 03/24/2017 02:28 AM    TOTAL BILIRUBIN 0.7 03/24/2017 02:28 AM    ALBUMIN 3.0* 03/24/2017 02:28 AM    GLOBULIN 2.6 03/24/2017 02:28 AM    PRE-ALBUMIN 9.0* 03/24/2017 02:28 AM    INR 3.66* 03/26/2017 12:45 AM     Lab Results   Component Value Date/Time    PT 37.5* 03/26/2017 12:45 AM    INR 3.66* 03/26/2017 12:45 AM

## 2017-03-27 NOTE — DISCHARGE PLANNING
Per CCT Shannan request, transport arranged for patient to transfer to his home in [facility] at 1400 via [facility].  CCT Shannan advised of transport time.

## 2017-03-27 NOTE — PROGRESS NOTES
Spoke to MD Peralta about wound care for anal fistula as there are no orders at this time. MD would like to continue packing wound as pt. Would prior to admission. RN to place order on MD's behalf.

## 2017-03-27 NOTE — PROGRESS NOTES
Pt. D/c'd home.to [facility] for in house hospice With [facility]. PIV d/c'd and hemostasis achieved, discharge paperwork given to pt., discussed and questions answered. Spoke to hospice RN who confirmed pt's care will be continued at home.

## 2017-03-27 NOTE — DISCHARGE INSTRUCTIONS
Discharge Instructions    Discharged to home by [facility] with self. Discharged via wheelchair, hospital escort: Yes.  Special equipment needed: Not Applicable    Be sure to schedule a follow-up appointment with your primary care doctor or any specialists as instructed.     Discharge Plan:   Influenza Vaccine Indication: Not indicated: Previously immunized this influenza season and > 8 years of age    I understand that a diet low in cholesterol, fat, and sodium is recommended for good health. Unless I have been given specific instructions below for another diet, I accept this instruction as my diet prescription.   Other diet: Nectar  thick    Special Instructions: None    · Is patient discharged on Warfarin / Coumadin?   No     · Is patient Post Blood Transfusion?  No    Depression / Suicide Risk    As you are [facility] from [facility], it is important to learn how to keep safe from harming yourself.    Recognize the warning signs:  · Abrupt changes in personality, positive or negative- including increase in energy   · Giving away possessions  · Change in eating patterns- significant weight changes-  positive or negative  · Change in sleeping patterns- unable to sleep or sleeping all the time   · Unwillingness or inability to communicate  · Depression  · Unusual sadness, discouragement and loneliness  · Talk of wanting to die  · Neglect of personal appearance   · Rebelliousness- reckless behavior  · Withdrawal from people/activities they love  · Confusion- inability to concentrate     If you or a loved one observes any of these behaviors or has concerns about self-harm, here's what you can do:  · Talk about it- your feelings and reasons for harming yourself  · Remove any means that you might use to hurt yourself (examples: pills, rope, extension cords, firearm)  · Get professional help from the community (Mental Health, Substance Abuse, psychological counseling)  · Do not be alone:Call your Safe  Contact- someone whom you trust who will be there for you.  · Call your local CRISIS HOTLINE 254-5695 or 779-666-6332  · Call your local Children's Mobile Crisis Response Team [place] (829) 229-4655 or www.INCHRON  · Call the toll free National Suicide Prevention Hotlines   · National Suicide Prevention Lifeline 283-062-VFQA (0798)  · National [employer] Line Network 800-SUICIDE (527-8177)

## 2018-03-08 NOTE — WOUND TEAM
Wound team consult placed for lizzie rectal abscess.  Pt is to be seen by surgeon for this issue.  No wound team interventions at this time including packing of the wound until surgery has assessed Pt.  Spoke with floor RN and recommended lizzie pad in a pair of spandage underwear to collect drainage/stool as Pt apparently also has a fistula.  Wound team to return if ordered by surgeon.   
18

## 2018-04-05 ENCOUNTER — PATIENT OUTREACH (OUTPATIENT)
Dept: HEALTH INFORMATION MANAGEMENT | Facility: OTHER | Age: 83
End: 2018-04-05

## 2024-08-25 NOTE — H&P
[facility] Admitting History and Physical    Name Jimmy Perez     1925   Age/Sex 89 y.o. male   MRN [id number]   Code Status  full code     After 5PM or if no immediate response to page, please call for cross-coverage  Attending/Team: Dr. Srinath LUDWIG Call [phone] to page   1st Call - Day Intern (R1):   Dr. Franco  2nd Call - Day Sr. Resident (R2/R3):   Dr. Ted Queen       Chief Complaint: perianal fistula       HPI:  The patient is 89 Y old male, poor historian and it is difficult to obtain full history from him.             He reports that he has hx of lesion in his perianal area, started one month ago, painful to him and he can not sit on his buttock without pain. He is not diabetic as he said. Denies any fever or chills, no chest pain or SOB. The lesion was packed 3 weeks ago as he said and now it opened again.    Per the wife, the patient did not see doctor for this perianal lesion, but he has home health with her nurse from [facility]. The nurse did packing for this lesion which seems to be fistula 3 weeks ago, took photos for the lesion and contacted Dr. Rene in [facility], the geriatric specialist who prescribed doxycycline 100 mg BID for 14 days and he has been on this ABX for 7 days.     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 Hx of pacemaker insertion 10 ys ago also. He has hx of pneumonia few months ago and admitted in [facility]. Has hx of Afib on warfarin  Admitted to ED, his vitals wnl, received 1 liters IV fluid, vancomycin and ampicillin/sulbactam.      The systemic review was obtained mainly from the wife    Review of Systems   Constitutional: Negative for fever and chills.   HENT: Positive for hearing loss. Negative for sore throat.    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    Cardiovascular: Negative  "[facility] Medicine  History & Physical    Patient Name: Amie Salmeron  MRN: [id number]  Patient Class: Emergency  Admission Date: 8/24/2024  Attending Physician: Magalys Muro,*   Primary Care Provider: Anuradha, Primary Doctor         Patient information was obtained from patient and ER records.     Subjective:     Principal Problem:Closed fracture of right hip    Chief Complaint:   Chief Complaint   Patient presents with    Fall     Arrives via EMS from [facility] reporting that she "tripped over her feet" and fell. Pt reporting R. Hip pain. -head injury, -blood thinners, -LOC. Hypotensive with EMS.    Hypotension        HPI: 61 year old female coming from [facility] by EMS with past medical history of HTN, uncontrolled HIV noncompliant with HAART, spinal MAC, spinal osteo s/p T11-L2 corpectomy with fixation by ortho on 2/27/24 at [facility]  who presenting with hypotension and report of a mechanical fall. Patient tells me she has severe right hip pain. She denies hitting her head or having loss of consciousness. She denies being on an anticoagulation. She reports her blood pressure is "always very low. Pt was here for paravertebral abscess, MRSA bacteremia. She was transferred [facility] for IR for the paravertebral abscess and subsequently transferred to the MICU for hypotension requiring vasopressor support. IR placed drain on 8/12. Abscess cultures grew AFB, Fusobacterium necleatum, ESBL E. Coli and blood cultures grew MRSA .She was discharged 8/24/24 to SNF to complete her antibiotics for total of 6 weeks of vancomycin and ertapenem ending 9/23.     Past Medical History:   Diagnosis Date    Allergy     Arthritis     Asthma     Chronic pain     HIV infection     Hypertension     Overactive bladder     Spinal stenosis     Urine incontinence        Past Surgical History:   Procedure Laterality Date    ADENOIDECTOMY      APPENDECTOMY      BACK SURGERY      GERALD    CYST " REMOVAL      HYSTERECTOMY      JOINT REPLACEMENT Right     knee, with revision    KNEE SURGERY Left     knee replacement    RHIZOTOMY      TONSILLECTOMY      TOTAL ANKLE ARTHROPLASTY Right        Review of patient's allergies indicates:  No Known Allergies    Current Facility-Administered Medications on File Prior to Encounter   Medication    [DISCONTINUED] acetaminophen tablet 650 mg    [DISCONTINUED] clarithromycin tablet 500 mg    [DISCONTINUED] dextrose 10% bolus 125 mL 125 mL    [DISCONTINUED] dextrose 10% bolus 250 mL 250 mL    [DISCONTINUED] dolutegravir Tab 50 mg    [DISCONTINUED] enoxaparin injection 30 mg    [DISCONTINUED] ertapenem (INVANZ) 1 g in 0.9% NaCl 100 mL IVPB (MB+)    [DISCONTINUED] ethambutoL tablet 700 mg    [DISCONTINUED] glucagon (human recombinant) injection 1 mg    [DISCONTINUED] glucose chewable tablet 16 g    [DISCONTINUED] glucose chewable tablet 24 g    [DISCONTINUED] iohexol (Omnipaque 350) oral solution 15 mL    [DISCONTINUED] lamiVUDine tablet 300 mg    [DISCONTINUED] LIDOcaine 5 % patch 1 patch    [DISCONTINUED] methocarbamoL tablet 500 mg    [DISCONTINUED] mupirocin 2 % ointment    [DISCONTINUED] naloxone 0.4 mg/mL injection 0.02 mg    [DISCONTINUED] oxyCODONE immediate release tablet Tab 10 mg    [DISCONTINUED] QUEtiapine tablet 25 mg    [DISCONTINUED] rifabutin capsule 300 mg    [DISCONTINUED] sodium chloride 0.9% flush 10 mL    [DISCONTINUED] sodium chloride 0.9% flush 10 mL    [DISCONTINUED] sodium chloride 0.9% flush 10 mL    [DISCONTINUED] sulfamethoxazole-trimethoprim 800-160mg per tablet 1 tablet    [DISCONTINUED] vancomycin - pharmacy to dose    [DISCONTINUED] vancomycin 750 mg in D5W 250 mL IVPB (Vial-Mate)     Current Outpatient Medications on File Prior to Encounter   Medication Sig    0.9% NaCl PgBk 100 mL with ertapenem 1 gram SolR 1 g Inject 1 g into the vein once daily.    acetaminophen (TYLENOL) 325 MG tablet Take 2 tablets (650 mg total) by mouth 3 (three) times  for chest pain, palpitations, orthopnea and PND.   Gastrointestinal: Negative for heartburn, nausea, vomiting, abdominal pain, diarrhea, constipation and melena.   Genitourinary: Negative for dysuria and urgency.   Musculoskeletal: Negative for back pain, falls and neck pain.   Skin: Negative for itching and rash.   Neurological: Negative for tremors, focal weakness, seizures, loss of consciousness and headaches.   Endo/Heme/Allergies: Does not bruise/bleed easily.   Psychiatric/Behavioral:        Difficult to obtain             Past Medical History:   Past Medical History   Diagnosis Date   • HTN (hypertension)    • A-fib (CMS-HCC)    • Cardiac pacemaker in situ    • BPH (benign prostatic hyperplasia)        Past Surgical History:  No past surgical history on file.    Current Outpatient Medications:  Home Medications     Reviewed by Laurence Castellon (Pharmacy Tech) on 03/22/17 at 1920  Med List Status: Complete    Medication Last Dose Status    acetaminophen (TYLENOL) 500 MG Tab 3/22/2017 Active    alfuzosin (UROXATRAL) 10 MG SR tablet 3/22/2017 Active    amlodipine (NORVASC) 2.5 MG Tab 3/22/2017 Active    calcitRIOL (ROCALTROL) 0.25 MCG Cap 3/22/2017 Active    Calcium Carb-Cholecalciferol (CALCIUM 500+D) 500-200 MG-UNIT Tab 3/22/2017 Active    docusate calcium (SURFAK) 240 MG Cap 3/22/2017 Active    doxycycline monohydrate (ADOXA) 100 MG tablet 3/22/2017 Active    finasteride (PROSCAR) 5 MG Tab 3/22/2017 Active    furosemide (LASIX) 20 MG Tab 3/22/2017 Active    gabapentin (NEURONTIN) 100 MG Cap 3/21/2017 Active    gabapentin (NEURONTIN) 100 MG Cap 3/19/2017 Active    potassium chloride SA (KDUR) 20 MEQ Tab CR 3/22/2017 Active    sertraline (ZOLOFT) 25 MG tablet 3/22/2017 Active    warfarin (COUMADIN) 2 MG Tab 3/21/2017 Active                    Medication Allergy/Sensitivities:  No Known Allergies      Family History:  No family history on file.     Difficult to obtain    Social History:  Social History      Social History   • Marital Status: Single     Spouse Name: N/A   • Number of Children: N/A   • Years of Education: N/A     Occupational History   • Not on file.     Social History Main Topics   • Smoking status: Not on file   • Smokeless tobacco: Not on file   • Alcohol Use: Not on file   • Drug Use: Not on file   • Sexual Activity: Not on file     Other Topics Concern   • Not on file     Social History Narrative   • No narrative on file     Lives with his wife, no smoking history, no ETOH drinking, no illicit drug use.    Physical Exam     Filed Vitals:    03/22/17 1530 03/22/17 1600 03/22/17 1630 03/22/17 1730   BP:       Pulse: 71 73 73    Resp: 22 26 18 18   Height:       Weight:       SpO2: 97% 96% 96%      Body mass index is 27.12 kg/(m^2).  /87 mmHg  Pulse 73  Resp 18  Ht 1.829 m (6')  Wt 90.719 kg (200 lb)  BMI 27.12 kg/m2  SpO2 96%  O2 therapy: Pulse Oximetry: 96 %, O2 (LPM): 2    Physical Exam   Constitutional: He is oriented to person, place, and time.   Lethargic, dehydrated old man, lying uncomfortable in his bed, poorly communicative    HENT:   Head: Normocephalic and atraumatic.   Eyes: Conjunctivae and EOM are normal. Pupils are equal, round, and reactive to light. No scleral icterus.   Neck: Normal range of motion. Neck supple. No JVD present. No tracheal deviation present. No thyromegaly present.   Cardiovascular: Normal rate, regular rhythm, normal heart sounds and intact distal pulses.  Exam reveals no gallop and no friction rub.    No murmur heard.  Pulmonary/Chest: Effort normal and breath sounds normal. No stridor. No respiratory distress. He has no wheezes. He has no rales. He exhibits no tenderness.   Abdominal: Soft. Bowel sounds are normal. He exhibits no distension and no mass. There is no tenderness. There is no rebound and no guarding.   Genitourinary:   Package protruding from small opening at the right side of the perianal area, no discharge   Musculoskeletal: Normal  daily.    albuterol (PROVENTIL/VENTOLIN HFA) 90 mcg/actuation inhaler Inhale 2 puffs into the lungs every 6 (six) hours as needed. Rescue    clarithromycin (BIAXIN) 500 MG tablet Take 1 tablet (500 mg total) by mouth every 12 (twelve) hours.    D5W SolP 250 mL with vancomycin 750 mg SolR 750 mg Inject 750 mg into the vein every 12 (twelve) hours.    dolutegravir-lamivudine (DOVATO)  mg Tab Take 1 tablet by mouth once daily.    ethambutoL (MYAMBUTOL) 400 MG Tab Take 2 tablets (800 mg total) by mouth once daily.    LIDOcaine (LIDODERM) 5 % Place 1 patch onto the skin once daily.    methocarbamoL (ROBAXIN) 500 MG Tab Take 1 tablet (500 mg total) by mouth 4 (four) times daily. for 10 days    oxyCODONE (ROXICODONE) 10 mg Tab immediate release tablet Take 1 tablet (10 mg total) by mouth every 4 (four) hours as needed for Pain.    QUEtiapine (SEROQUEL) 25 MG Tab Take 1 tablet (25 mg total) by mouth every evening.    rifabutin (MYCOBUTIN) 150 mg Cap Take 300 mg by mouth once daily.    sulfamethoxazole-trimethoprim 800-160mg (BACTRIM DS) 800-160 mg Tab Take 1 tablet by mouth 3 (three) times a week.     Family History       Problem Relation (Age of Onset)    Alcohol abuse Brother    Cancer Father    Depression Sister    Hypertension Mother, Brother    Thyroid disease Mother          Tobacco Use    Smoking status: Never    Smokeless tobacco: Never   Substance and Sexual Activity    Alcohol use: Yes     Comment: Socially    Drug use: No    Sexual activity: Yes     Partners: Male     Birth control/protection: None     Review of Systems   Constitutional:  Negative for appetite change.   Respiratory:  Negative for cough and shortness of breath.    Cardiovascular:  Negative for chest pain and leg swelling.   Gastrointestinal:  Negative for abdominal distention, abdominal pain, constipation and diarrhea.   Genitourinary:  Negative for difficulty urinating and dysuria.   Musculoskeletal:  Positive for arthralgias.    Neurological:  Negative for dizziness and headaches.     Objective:     Vital Signs (Most Recent):  Temp: 98.6 °F (37 °C) (08/25/24 0020)  Pulse: 73 (08/25/24 0120)  Resp: 16 (08/25/24 0120)  BP: 94/61 (08/25/24 0120)  SpO2: 100 % (08/25/24 0120) Vital Signs (24h Range):  Temp:  [98.2 °F (36.8 °C)-98.6 °F (37 °C)] 98.6 °F (37 °C)  Pulse:  [72-77] 73  Resp:  [12-18] 16  SpO2:  [99 %-100 %] 100 %  BP: ()/(45-61) 94/61     Weight: 45.8 kg (100 lb 15.5 oz)  Body mass index is 17.33 kg/m².     Physical Exam  Constitutional:       Appearance: Normal appearance.   HENT:      Head: Normocephalic and atraumatic.      Mouth/Throat:      Mouth: Mucous membranes are moist.   Cardiovascular:      Rate and Rhythm: Normal rate and regular rhythm.      Pulses: Normal pulses.      Heart sounds: Normal heart sounds.   Pulmonary:      Effort: Pulmonary effort is normal.      Breath sounds: Normal breath sounds. No rales.   Abdominal:      General: Abdomen is flat. Bowel sounds are normal. There is no distension.      Palpations: Abdomen is soft.      Tenderness: There is no abdominal tenderness.   Musculoskeletal:         General: Tenderness present. Normal range of motion.      Cervical back: Normal range of motion and neck supple.   Skin:     General: Skin is warm and dry.   Neurological:      General: No focal deficit present.      Mental Status: She is alert and oriented to person, place, and time.   Psychiatric:         Mood and Affect: Mood normal.                Significant Labs: All pertinent labs within the past 24 hours have been reviewed.    Significant Imaging: I have reviewed all pertinent imaging results/findings within the past 24 hours.  Assessment/Plan:     * Closed fracture of right hip  Fracture  Surgical Risk Assessment:    Active Cardiac Issues:  Active decompensated heart failure? No   Unstable angina?  No   Significant uncontrolled arrhythmias? No   Severe valvular heart disease-Aortic or Mitral  range of motion. He exhibits no edema or tenderness.   Left leg amputated above knee joint. Dark pigmentation at the shin area of right leg   Lymphadenopathy:     He has no cervical adenopathy.   Neurological: He is alert and oriented to person, place, and time. He has normal reflexes. GCS score is 15.   Difficult to do neurological examination due to poor cooperation of the patient.    Skin: Skin is dry. He is not diaphoretic.   Psychiatric:   Difficult to examine             Data Review       Old Records Request:   Completed  Current Records review and summary: Completed    Lab Data Review:  Recent Results (from the past 24 hour(s))   LACTIC ACID    Collection Time: 03/22/17  3:13 PM   Result Value Ref Range    Lactic Acid 1.2 0.5 - 2.0 mmol/L   CBC WITH DIFFERENTIAL    Collection Time: 03/22/17  3:13 PM   Result Value Ref Range    WBC 2.5 (LL) 4.8 - 10.8 K/uL    RBC 3.36 (L) 4.70 - 6.10 M/uL    Hemoglobin 9.7 (L) 14.0 - 18.0 g/dL    Hematocrit 29.5 (L) 42.0 - 52.0 %    MCV 87.8 81.4 - 97.8 fL    MCH 28.9 27.0 - 33.0 pg    MCHC 32.9 (L) 33.7 - 35.3 g/dL    RDW 48.8 35.9 - 50.0 fL    Platelet Count 120 (L) 164 - 446 K/uL    MPV 10.0 9.0 - 12.9 fL    Neutrophils-Polys 62.50 44.00 - 72.00 %    Lymphocytes 25.10 22.00 - 41.00 %    Monocytes 10.00 0.00 - 13.40 %    Eosinophils 0.80 0.00 - 6.90 %    Basophils 0.40 0.00 - 1.80 %    Immature Granulocytes 1.20 (H) 0.00 - 0.90 %    Nucleated RBC 0.00 /100 WBC    Neutrophils (Absolute) 1.57 (L) 1.82 - 7.42 K/uL    Lymphs (Absolute) 0.63 (L) 1.00 - 4.80 K/uL    Monos (Absolute) 0.25 0.00 - 0.85 K/uL    Eos (Absolute) 0.02 0.00 - 0.51 K/uL    Baso (Absolute) 0.01 0.00 - 0.12 K/uL    Immature Granulocytes (abs) 0.03 0.00 - 0.11 K/uL    NRBC (Absolute) 0.00 K/uL   COMP METABOLIC PANEL    Collection Time: 03/22/17  3:13 PM   Result Value Ref Range    Sodium 141 135 - 145 mmol/L    Potassium 3.5 (L) 3.6 - 5.5 mmol/L    Chloride 109 96 - 112 mmol/L    Co2 25 20 - 33 mmol/L    Anion  Gap 7.0 0.0 - 11.9    Glucose 108 (H) 65 - 99 mg/dL    Bun 27 (H) 8 - 22 mg/dL    Creatinine 1.08 0.50 - 1.40 mg/dL    Calcium 9.9 8.5 - 10.5 mg/dL    AST(SGOT) 11 (L) 12 - 45 U/L    ALT(SGPT) 8 2 - 50 U/L    Alkaline Phosphatase 60 30 - 99 U/L    Total Bilirubin 0.6 0.1 - 1.5 mg/dL    Albumin 3.2 3.2 - 4.9 g/dL    Total Protein 6.2 6.0 - 8.2 g/dL    Globulin 3.0 1.9 - 3.5 g/dL    A-G Ratio 1.1 g/dL   URINALYSIS    Collection Time: 03/22/17  3:13 PM   Result Value Ref Range    Color Lt. Yellow     Character Clear     Specific Gravity 1.010 <1.035    Ph 6.0 5.0-8.0    Glucose Negative Negative mg/dL    Ketones Negative Negative mg/dL    Protein Negative Negative mg/dL    Bilirubin Negative Negative    Nitrite Negative Negative    Leukocyte Esterase Negative Negative    Occult Blood Negative Negative    Micro Urine Req see below    ESTIMATED GFR    Collection Time: 03/22/17  3:13 PM   Result Value Ref Range    GFR If African American >60 >60 mL/min/1.73 m 2    GFR If Non African American >60 >60 mL/min/1.73 m 2       Imaging/Procedures Review:    ndependant Imaging Review: Completed  CT-ABDOMEN-PELVIS WITH   Final Result      1.  Perianal fistula. Details of sphincter involvement cannot be determined on CT. No abscess is appreciated.      2.  Hiatal hernia.      3.  Diverticulosis.      4.  2.5 cm indeterminate hypodense left renal mass, possibly a hemorrhagic cyst. If clinically indicated, nonemergent renal protocol CT or MRI could be performed for further evaluation.      5.  Atherosclerosis.      6.  Small bilateral pleural effusions with adjacent atelectasis. Peribronchial thickening in the left lung base is consistent with inflammation/bronchitis.      7.  Cardiomegaly      DX-CHEST-PORTABLE (1 VIEW)   Final Result      1.  Mild basilar atelectasis or scar      2.  Enlarged cardiac silhouette               EKG:   EKG Independant Review: Completed      Ventricular paced rhythm, LBB morphology, HR 70, QTc 497  Stenosis? No   Recent MI or coronary revascularization < 30 days? No     Cardiac Risk Factors:  Intermediate/high risk surgery? No   History of CAD/ischemic heart disease? No   History of cerebrovascular disease? No   History of compensated heart failure? No   Type 2 diabetes requiring insulin? No   Serum Creatinine > 2? No   Total cardiac risk factors 0     Functional mets yes    < 4 METs -unable to walk > 2 blocks on level ground without stopping due to symptoms  - eating, dressing, toileting, walking indoors, light housework. POOR   > 4 METs -climbing > 1 flight of stairs without stopping  -walking up hill > 1-2 blocks  -scrubbing floors  -moving furniture  - golf, bowling, dancing or tennis  -running short distance MODERATE to EXCELLENT       Hip Fracture Pathway initiated  Orthopedics consulted.  Plan to go to the OR on sunday.    For high risk of post-op pulmonary complications, scheduled duonebs and incentive spirometry to start after surgery   For high risk of post-op delirium, minimize CNS-active meds (opiatess, benzos, anticholinergics) and sleep hygiene with windowshades open during day, no daytime naps, frequent re-orientation, private room if feasible  For probable senile osteoporosis, check Vitamin D level.  If/for Vit D deficiency and probable senile osteopenia/osteoporosis, start Vit D and Ca, follow up in Ortho clinic per new protocol  DVT prophylaxis for 4 weeks post-op  PT/OT to start on POD 1  Alvarez to be removed on POD 1  Pain control:  Pregabalin 75mg PO qHS and scheduled Tylenol 1g TID.  Oxy PO prn with Morphine IV prn for pain not controlled with PO medications    Perioperative Risk Assessment:  RCRI Score 0,  risk with 3.9% risk of cardiopulmonary complications          Vertebral abscess  S/p drainage on last admission   Currently on vanco and ertapenem for MRAS and ESBL   ID consult am       Disseminated mycobacterium avium-intracellulare complex  Resumed antibiotic treatment        AIDS  Resumed home med        VTE Risk Mitigation (From admission, onward)           Ordered     IP VTE HIGH RISK PATIENT  Once         08/25/24 0210     Place sequential compression device  Until discontinued         08/25/24 0210                                    Joanna Hernandes MD  Department of Hospital Medicine  [facility] - Emergency Dept           ms           Assessment/Plan     No new assessment & plan notes have been filed under this hospital service since the last note was generated.  Service: MEDICAL      Anticipated Hospital stay:  >2 midnights    # perianal fistula     - hx of perianal lesion, painful  - /74 mmHg  Pulse 68  Temp(Src) 36.3 °C (97.3 °F)  Resp 20  Ht 1.829 m (6')  Wt 86.5 kg (190 lb 11.2 oz)  BMI 25.86 kg/m2  SpO2 100%   - has leukopenia on admission, wbc 2.5 then 5.5 which is normal, but there is left shift  - lactic acid 1.2 which is normal  -  CT pelvis  Showed : No abscess is identified. There is a small perianal fistula which extends into the soft tissues of the right perineum.  - failed doxycycline for 7 days    Plan :  - to start broad spectrum ABX with vancomycin and zosyn instead of doxycycline  - surgery consultation   - wound care  - to continue on IV fluid with  ml/h    # debility  - generalized weakness for many months  - sent for B12, folate level, vit D, TSH, prealbumin    # pancytopenia  - on admission, wbc 2.5, HB 9.7, Plt 120  - no hx of alcohol use, no liver disease  - sent for b12, folate, TSH  - day team to follow and BM study can be included.    # hypokalemia  - K 3.5 then 3.2  - for replacement    # HTN  - to continue on amlodipine 5 mg daily    # hx of Afib   - on warfarin 3 mg daily  - last INR 2.8   2 days ago  - to continue on warfarin per pharmacy dose    Quality Measures  EKG reviewed, Labs reviewed, Medications reviewed and Radiology images reviewed  Palma catheter: No Palma      DVT Prophylaxis: Warfarin (Coumadin)